# Patient Record
Sex: FEMALE | Race: WHITE | NOT HISPANIC OR LATINO | Employment: OTHER | URBAN - METROPOLITAN AREA
[De-identification: names, ages, dates, MRNs, and addresses within clinical notes are randomized per-mention and may not be internally consistent; named-entity substitution may affect disease eponyms.]

---

## 2020-07-16 ENCOUNTER — TELEPHONE (OUTPATIENT)
Dept: TRANSPLANT | Facility: CLINIC | Age: 85
End: 2020-07-16

## 2020-07-16 NOTE — TELEPHONE ENCOUNTER
Patient called to get information about the referral but there was no answer unable to leave a message voice mail not set up

## 2020-07-16 NOTE — TELEPHONE ENCOUNTER
----- Message from Kimberly Quiñones MA sent at 7/16/2020 10:05 AM CDT -----    ----- Message -----  From: Suyapa Kelly  Sent: 7/16/2020   9:42 AM CDT  To: [facility]to Laurent Staff      Name of Who is Calling Nena with        What is the request in detail: Pt would like to be schedule from referral , states referral was sent on July 6, 2020 . Please call to schedule patient.Please contact to further discuss and advise    Can the clinic reply by [facility]: no      What Number to Call Back if not in [facility]: [phone]

## 2020-07-23 ENCOUNTER — DOCUMENTATION ONLY (OUTPATIENT)
Dept: TRANSPLANT | Facility: CLINIC | Age: 85
End: 2020-07-23

## 2020-07-23 ENCOUNTER — TELEPHONE (OUTPATIENT)
Dept: TRANSPLANT | Facility: CLINIC | Age: 85
End: 2020-07-23

## 2020-07-23 NOTE — NURSING
Pt records reviewed.   Pt will be referred to Hepatology.  Abnormal imaging with BX  Patient to get outside imaging.

## 2020-07-23 NOTE — TELEPHONE ENCOUNTER
----- Message from Kimberly Quiñones MA sent at 7/22/2020  4:20 PM CDT -----    ----- Message -----  From: Alondra Tilley  Sent: 7/22/2020  12:22 PM CDT  To: [facility]to Laurent Staff    Pt wants to speak with you regarding scheduling appt for consult.   Pt#[phone]

## 2020-07-24 ENCOUNTER — DOCUMENTATION ONLY (OUTPATIENT)
Dept: TRANSPLANT | Facility: CLINIC | Age: 85
End: 2020-07-24

## 2020-07-24 NOTE — NURSING
"Spoke to Dr Pressley, he will see the patient.  Gayla Kyle LPN notified to schedule. Pt has CD"s   "

## 2020-07-30 ENCOUNTER — INITIAL CONSULT (OUTPATIENT)
Dept: TRANSPLANT | Facility: CLINIC | Age: 85
End: 2020-07-30
Payer: MEDICARE

## 2020-07-30 VITALS
HEIGHT: 66 IN | HEART RATE: 89 BPM | OXYGEN SATURATION: 97 % | TEMPERATURE: 99 F | BODY MASS INDEX: 36.21 KG/M2 | SYSTOLIC BLOOD PRESSURE: 118 MMHG | DIASTOLIC BLOOD PRESSURE: 74 MMHG | WEIGHT: 225.31 LBS | RESPIRATION RATE: 16 BRPM

## 2020-07-30 DIAGNOSIS — R16.0 LIVER MASS, RIGHT LOBE: ICD-10-CM

## 2020-07-30 PROCEDURE — 1159F PR MEDICATION LIST DOCUMENTED IN MEDICAL RECORD: ICD-10-PCS | Mod: S$GLB,,, | Performed by: TRANSPLANT SURGERY

## 2020-07-30 PROCEDURE — 1125F PR PAIN SEVERITY QUANTIFIED, PAIN PRESENT: ICD-10-PCS | Mod: S$GLB,,, | Performed by: TRANSPLANT SURGERY

## 2020-07-30 PROCEDURE — 1125F AMNT PAIN NOTED PAIN PRSNT: CPT | Mod: S$GLB,,, | Performed by: TRANSPLANT SURGERY

## 2020-07-30 PROCEDURE — 99999 PR PBB SHADOW E&M-EST. PATIENT-LVL III: CPT | Mod: PBBFAC,,,

## 2020-07-30 PROCEDURE — 99204 PR OFFICE/OUTPT VISIT, NEW, LEVL IV, 45-59 MIN: ICD-10-PCS | Mod: S$GLB,,, | Performed by: TRANSPLANT SURGERY

## 2020-07-30 PROCEDURE — 1101F PT FALLS ASSESS-DOCD LE1/YR: CPT | Mod: CPTII,S$GLB,, | Performed by: TRANSPLANT SURGERY

## 2020-07-30 PROCEDURE — 99999 PR PBB SHADOW E&M-EST. PATIENT-LVL III: ICD-10-PCS | Mod: PBBFAC,,,

## 2020-07-30 PROCEDURE — 1101F PR PT FALLS ASSESS DOC 0-1 FALLS W/OUT INJ PAST YR: ICD-10-PCS | Mod: CPTII,S$GLB,, | Performed by: TRANSPLANT SURGERY

## 2020-07-30 PROCEDURE — 1159F MED LIST DOCD IN RCRD: CPT | Mod: S$GLB,,, | Performed by: TRANSPLANT SURGERY

## 2020-07-30 PROCEDURE — 99204 OFFICE O/P NEW MOD 45 MIN: CPT | Mod: S$GLB,,, | Performed by: TRANSPLANT SURGERY

## 2020-07-30 RX ORDER — TRAMADOL HYDROCHLORIDE 50 MG/1
50 TABLET ORAL EVERY 12 HOURS PRN
Qty: 20 TABLET | Refills: 0 | Status: SHIPPED | OUTPATIENT
Start: 2020-07-30

## 2020-07-30 NOTE — H&P (VIEW-ONLY)
Subjective:       Patient ID: Magaly Hutchinson is a 84 y.o. female.    Chief Complaint: Abnormal Abdominal/Liver Imaging (Large liver mass)    84 F referred for management of large liver mass. She has noted abdominal distension over past several months/year which she attributed to weight gain. A few months ago she noted a pre-syncopal episode, abdominal pain. She was ultimately diagnosed with very large right lobe liver mass extending into the right retroperitoneum with displacement of right kidney and colon and compression of vena cava. She has also noted increased swelling in her legs, right greater than left. She denies any personal history of cancer. Today she is in a wheelchair on account of longer walking distances, but she gets around the house. She lives with her granddaughter and great grandson who help care for her, but she is able to carry out ADLs. 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    Review of Systems   Constitutional: Negative for activity change and appetite change.   HENT: Negative for sinus pressure/congestion and sore throat.    Eyes: Negative for redness and visual disturbance.   Respiratory: Negative for cough and shortness of breath.    Cardiovascular: Negative for chest pain and palpitations.   Gastrointestinal: Negative for abdominal distention and abdominal pain.   Endocrine: Negative for polydipsia and polyuria.   Genitourinary: Negative for dysuria and frequency.   Musculoskeletal: Negative for arthralgias and back pain.   Integumentary:  Negative for rash and wound.   Allergic/Immunologic: Negative for environmental allergies and immunocompromised state.   Neurological: Negative for tremors and seizures.   Hematological: Negative for adenopathy. Does not bruise/bleed easily.   Psychiatric/Behavioral: Negative for behavioral problems and confusion.         Objective:      Physical Exam  Constitutional:       General: She is not in  acute distress.     Appearance: She is not diaphoretic.   Neck:      Vascular: No JVD.   Cardiovascular:      Rate and Rhythm: Normal rate and regular rhythm.   Pulmonary:      Effort: Pulmonary effort is normal. No respiratory distress.      Breath sounds: No wheezing.   Abdominal:      Palpations: Abdomen is soft.   Skin:     General: Skin is warm and dry.   Neurological:      Mental Status: She is alert and oriented to person, place, and time.         Assessment:       1. Liver mass, right lobe        Plan:       Large mass with cystic features. Biopsy from outside center benign, will follow up path results. Imaging most consistent with complex hepatic cyst vs hemangioma, most likely the former. However, the mass is creating significant discomfort and displacement of abdominal structures, concerning for possible vena cava compression. Despite her age, her mind is sharp and she is very active for her age. I expect she will continue to have worsening symptoms and complications related to the mass. Although surgical resection comes with increased risk given her age, I think the benefits outweigh the risks and are in line with her personal goals of care. I discussed in details the risks of surgery including death, bleeding, infection, bile leak, ileus, liver insufficiency, liver failure, renal failure and recurrence.     She would like to proceed with resection. She will need an echocardiogram and pre op assessment with Dr Nolasco. I will upload her images and discuss her case at tumor board.    Pa Pressley MD  Liver Transplant and Hepatobiliary Surgery      2. The status of comorbities. (See ED/admit documents)

## 2020-07-30 NOTE — PROGRESS NOTES
Subjective:       Patient ID: Magaly Hutchinson is a 84 y.o. female.    Chief Complaint: Abnormal Abdominal/Liver Imaging (Large liver mass)    84 F referred for management of large liver mass. She has noted abdominal distension over past several months/year which she attributed to weight gain. A few months ago she noted a pre-syncopal episode, abdominal pain. She was ultimately diagnosed with very large right lobe liver mass extending into the right retroperitoneum with displacement of right kidney and colon and compression of vena cava. She has also noted increased swelling in her legs, right greater than left. She denies any personal history of cancer. Today she is in a wheelchair on account of longer walking distances, but she gets around the house. She lives with her granddaughter and great grandson who help care for her, but she is able to carry out ADLs. 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    Review of Systems   Constitutional: Negative for activity change and appetite change.   HENT: Negative for sinus pressure/congestion and sore throat.    Eyes: Negative for redness and visual disturbance.   Respiratory: Negative for cough and shortness of breath.    Cardiovascular: Negative for chest pain and palpitations.   Gastrointestinal: Negative for abdominal distention and abdominal pain.   Endocrine: Negative for polydipsia and polyuria.   Genitourinary: Negative for dysuria and frequency.   Musculoskeletal: Negative for arthralgias and back pain.   Integumentary:  Negative for rash and wound.   Allergic/Immunologic: Negative for environmental allergies and immunocompromised state.   Neurological: Negative for tremors and seizures.   Hematological: Negative for adenopathy. Does not bruise/bleed easily.   Psychiatric/Behavioral: Negative for behavioral problems and confusion.         Objective:      Physical Exam  Constitutional:       General: She is not in  acute distress.     Appearance: She is not diaphoretic.   Neck:      Vascular: No JVD.   Cardiovascular:      Rate and Rhythm: Normal rate and regular rhythm.   Pulmonary:      Effort: Pulmonary effort is normal. No respiratory distress.      Breath sounds: No wheezing.   Abdominal:      Palpations: Abdomen is soft.   Skin:     General: Skin is warm and dry.   Neurological:      Mental Status: She is alert and oriented to person, place, and time.         Assessment:       1. Liver mass, right lobe        Plan:       Large mass with cystic features. Biopsy from outside center benign, will follow up path results. Imaging most consistent with complex hepatic cyst vs hemangioma, most likely the former. However, the mass is creating significant discomfort and displacement of abdominal structures, concerning for possible vena cava compression. Despite her age, her mind is sharp and she is very active for her age. I expect she will continue to have worsening symptoms and complications related to the mass. Although surgical resection comes with increased risk given her age, I think the benefits outweigh the risks and are in line with her personal goals of care. I discussed in details the risks of surgery including death, bleeding, infection, bile leak, ileus, liver insufficiency, liver failure, renal failure and recurrence.     She would like to proceed with resection. She will need an echocardiogram and pre op assessment with Dr Nolasco. I will upload her images and discuss her case at tumor board.    Pa Pressley MD  Liver Transplant and Hepatobiliary Surgery

## 2020-08-04 ENCOUNTER — TELEPHONE (OUTPATIENT)
Dept: PREADMISSION TESTING | Facility: HOSPITAL | Age: 85
End: 2020-08-04

## 2020-08-04 ENCOUNTER — TELEPHONE (OUTPATIENT)
Dept: TRANSPLANT | Facility: CLINIC | Age: 85
End: 2020-08-04

## 2020-08-04 DIAGNOSIS — R16.0 LIVER MASS, RIGHT LOBE: ICD-10-CM

## 2020-08-04 DIAGNOSIS — Z01.818 PRE-OP TESTING: Primary | ICD-10-CM

## 2020-08-04 NOTE — TELEPHONE ENCOUNTER
Returned call to patient to see what she was calling regarding. Patient was asking about an EKG, we have none scheduled but will need one prior to surgery. Patient needs ECHO. Patient states she wanted it at [facility] but is okay having it scheduled at [facility] in [place]. Will schedule appts.

## 2020-08-04 NOTE — TELEPHONE ENCOUNTER
----- Message from Gayla Kyle LPN sent at 8/4/2020 11:51 AM CDT -----  Regarding: Pre op with Dr. Nolasco  Good morning,     Dr. Pressley would like this patient seen by Dr. Nolasco for pre op clearance for liver surgery.   Can you help with this?     Thanks,   Vick

## 2020-08-04 NOTE — TELEPHONE ENCOUNTER
8/6 spoke with patient. She is waiting on a call about a stress test. She will do it here or [facility]

## 2020-08-05 ENCOUNTER — TELEPHONE (OUTPATIENT)
Dept: TRANSPLANT | Facility: CLINIC | Age: 85
End: 2020-08-05

## 2020-08-05 NOTE — TELEPHONE ENCOUNTER
Called patient to notify her that ECHO is scheduled for Monday, August 10 at 3:15. Verbalized acknowledgment.

## 2020-08-06 ENCOUNTER — TELEPHONE (OUTPATIENT)
Dept: TRANSPLANT | Facility: HOSPITAL | Age: 85
End: 2020-08-06

## 2020-08-06 ENCOUNTER — INITIAL CONSULT (OUTPATIENT)
Dept: INTERNAL MEDICINE | Facility: CLINIC | Age: 85
End: 2020-08-06
Payer: MEDICARE

## 2020-08-06 VITALS
BODY MASS INDEX: 36 KG/M2 | RESPIRATION RATE: 18 BRPM | WEIGHT: 224 LBS | TEMPERATURE: 98 F | HEART RATE: 92 BPM | HEIGHT: 66 IN | DIASTOLIC BLOOD PRESSURE: 78 MMHG | SYSTOLIC BLOOD PRESSURE: 128 MMHG | OXYGEN SATURATION: 97 %

## 2020-08-06 DIAGNOSIS — Z79.02 LONG TERM (CURRENT) USE OF ANTITHROMBOTICS/ANTIPLATELETS: ICD-10-CM

## 2020-08-06 DIAGNOSIS — R60.9 EDEMA, UNSPECIFIED TYPE: ICD-10-CM

## 2020-08-06 DIAGNOSIS — R16.0 LIVER MASS, RIGHT LOBE: Primary | ICD-10-CM

## 2020-08-06 DIAGNOSIS — D64.9 ANEMIA, UNSPECIFIED TYPE: ICD-10-CM

## 2020-08-06 DIAGNOSIS — M54.31 SCIATICA OF RIGHT SIDE: ICD-10-CM

## 2020-08-06 DIAGNOSIS — Z96.653 HISTORY OF BILATERAL KNEE REPLACEMENT: ICD-10-CM

## 2020-08-06 DIAGNOSIS — K59.00 CONSTIPATION, UNSPECIFIED CONSTIPATION TYPE: ICD-10-CM

## 2020-08-06 DIAGNOSIS — E87.1 HYPONATREMIA: ICD-10-CM

## 2020-08-06 DIAGNOSIS — Z90.710 H/O: HYSTERECTOMY: ICD-10-CM

## 2020-08-06 DIAGNOSIS — K76.9 LIVER DISEASE, UNSPECIFIED: Primary | ICD-10-CM

## 2020-08-06 DIAGNOSIS — R16.0 LIVER MASS, RIGHT LOBE: ICD-10-CM

## 2020-08-06 DIAGNOSIS — K76.9 LIVER LESION, RIGHT LOBE: ICD-10-CM

## 2020-08-06 DIAGNOSIS — D37.6 NEOPLASM OF UNCERTAIN BEHAVIOR OF LIVER: ICD-10-CM

## 2020-08-06 DIAGNOSIS — E11.9 TYPE 2 DIABETES MELLITUS WITHOUT COMPLICATION, WITHOUT LONG-TERM CURRENT USE OF INSULIN: ICD-10-CM

## 2020-08-06 DIAGNOSIS — Z01.818 PRE-OP TESTING: Primary | ICD-10-CM

## 2020-08-06 DIAGNOSIS — N28.9 RENAL IMPAIRMENT: ICD-10-CM

## 2020-08-06 DIAGNOSIS — E46 MALNUTRITION, UNSPECIFIED TYPE: ICD-10-CM

## 2020-08-06 DIAGNOSIS — R97.8 OTHER ABNORMAL TUMOR MARKERS: ICD-10-CM

## 2020-08-06 DIAGNOSIS — R63.4 WEIGHT LOSS: ICD-10-CM

## 2020-08-06 DIAGNOSIS — E88.09 SERUM ALBUMIN DECREASED: ICD-10-CM

## 2020-08-06 DIAGNOSIS — R93.2 ABNORMAL FINDINGS ON DIAGNOSTIC IMAGING OF LIVER AND BILIARY TRACT: ICD-10-CM

## 2020-08-06 DIAGNOSIS — E78.5 HYPERLIPIDEMIA, UNSPECIFIED HYPERLIPIDEMIA TYPE: ICD-10-CM

## 2020-08-06 DIAGNOSIS — I10 ESSENTIAL HYPERTENSION: ICD-10-CM

## 2020-08-06 PROCEDURE — 99999 PR PBB SHADOW E&M-EST. PATIENT-LVL III: ICD-10-PCS | Mod: PBBFAC,,, | Performed by: HOSPITALIST

## 2020-08-06 PROCEDURE — 99204 OFFICE O/P NEW MOD 45 MIN: CPT | Mod: S$GLB,,, | Performed by: HOSPITALIST

## 2020-08-06 PROCEDURE — 99999 PR PBB SHADOW E&M-EST. PATIENT-LVL III: CPT | Mod: PBBFAC,,, | Performed by: HOSPITALIST

## 2020-08-06 PROCEDURE — 1159F PR MEDICATION LIST DOCUMENTED IN MEDICAL RECORD: ICD-10-PCS | Mod: S$GLB,,, | Performed by: HOSPITALIST

## 2020-08-06 PROCEDURE — 1159F MED LIST DOCD IN RCRD: CPT | Mod: S$GLB,,, | Performed by: HOSPITALIST

## 2020-08-06 PROCEDURE — 1101F PR PT FALLS ASSESS DOC 0-1 FALLS W/OUT INJ PAST YR: ICD-10-PCS | Mod: CPTII,S$GLB,, | Performed by: HOSPITALIST

## 2020-08-06 PROCEDURE — 99204 PR OFFICE/OUTPT VISIT, NEW, LEVL IV, 45-59 MIN: ICD-10-PCS | Mod: S$GLB,,, | Performed by: HOSPITALIST

## 2020-08-06 PROCEDURE — 1101F PT FALLS ASSESS-DOCD LE1/YR: CPT | Mod: CPTII,S$GLB,, | Performed by: HOSPITALIST

## 2020-08-06 RX ORDER — METFORMIN HYDROCHLORIDE 500 MG/1
500 TABLET ORAL
Status: ON HOLD | COMMUNITY
End: 2020-08-31 | Stop reason: HOSPADM

## 2020-08-06 NOTE — ASSESSMENT & PLAN NOTE
At the age of 37 Y  Had 4 children, 4 miss carriages   Has ovaries retained  Had hysterectomy as she completed her family

## 2020-08-06 NOTE — ASSESSMENT & PLAN NOTE
Swelling of the legs   Since April 2020  Rt > Left   No leg pains  Was given diuretic- no longer taking that as she did not find them helpful    Sleeps with legs elevated that is helping the edema    Not known to have Heart , liver , kidney problems  Not known to have proteinuria, Nephrotic syndrome     No thrombosis     Likely cause of Edema- venous congestion from the abdominal tumor    Edema- I suggested avoidance of added salt,avoidance of NSAID's, unless advised or ordered  and suggested Limb elevation

## 2020-08-06 NOTE — LETTER
August 6, 2020      Pa Pressley MD  [address] [facility] [address]           [facility] - Pre Op Consult  [address] [facility] [address]  Phone: [phone]          Patient: Magaly Hutchinson   MR Number: [id number]   YOB: 1935   Date of Visit: 8/6/2020       Dear Dr. Pa Pressley:    Thank you for referring Magaly Hutchinson to me for evaluation. Attached you will find relevant portions of my assessment and plan of care.    If you have questions, please do not hesitate to call me. I look forward to following Magaly Hutchinson along with you.    Sincerely,    Betty Nolasco MD    Enclosure  CC:  Adriano Reynaga MD    If you would like to receive this communication electronically, please contact [email or web address] or [phone] to request more information on [employer] access.    For providers and/or their staff who would like to refer a patient to [facility], please contact us through our one-stop-shop provider referral line, [facility], [place], at [phone].    If you feel you have received this communication in error or would no longer like to receive these types of communications, please e-mail [email or web address]

## 2020-08-06 NOTE — ASSESSMENT & PLAN NOTE
Right abdominal mass.     She noticed Abdominal distension - for 1.5 - 2 years  Tried diet thinking that she is gaining weight -with no positive result   Was having abdominal fullness for a few months    She has been troubled with severe  Right upper abdominal pain for 8-9 days     Pain increases with activity and decreases with lay down  and resting     Has pressure effects from the growth     Able to eat   Feels squires faster for the past few months  Lost about 20 pounds past 2 months  Had reduced appetite     Had some vomiting -from being full in the stomach ( since eating smaller meals )  - none for 1 month   Non projectile   Lately no vomiting     Has long standing constipation - uses OTC laxatives        Does not seem to have pressure effects on the lungs   No SOB  No cough, Phlegm     No suggestion of Atelectasis, Pneumonoia    On exam Decreased air entry Rt mid- Rt lower zones

## 2020-08-06 NOTE — ASSESSMENT & PLAN NOTE
Suggested protein intake ( shakes ) to help with healing process   Referred to Dietary  She did not have dietary evaluation

## 2020-08-06 NOTE — ASSESSMENT & PLAN NOTE
ASA 81 mg every other day   Preventive reason   No stroke, TIA, Heart attack   No vascular stenting     Holding ASA in preparation for surgery     Risks, benefits of ASA use discussed     I favor staying on  ASA ,if OK with surgeon , as she is at increased risk of thrombosis for venous stasis - will check with Surgeon      Corresponded with the surgeon   Staying on ASA gilberto op

## 2020-08-06 NOTE — ASSESSMENT & PLAN NOTE
As per history mild  Likely nutritional     Microcytic anemia  -  Soluble transferrin receptor Normal at 3.4 suggesting no Iron deficiency

## 2020-08-06 NOTE — HPI
History of present illness- I had the pleasure of meeting this pleasant 84 y.o. lady in the pre op clinic prior to her elective Abdominal surgery. The patient is new to me . Magaly was accompanied by grand daughter Alyx.    I have obtained the history by speaking to the patient and by reviewing the electronic health records.    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 until April 20210     Had a near fall in April 2020 , felt weak and was about to  fall   Was walking to her table , had her Coffee , V8 - stopped the fall by holding on a chair   sat on her chair ,Put her head down - came around   No LOC  Walked out side     No heart palpitations  No preceding dizziness , chest discomfort , dizziness      Being a diabetic thought that it could be hypoglycemia - Dis not have a glucometer   Returned to normal in about 10 minutes   In 30 minutes she was able to drive and paid her bills    May 3 rd had seem MD routinely for Medication refill  Had labs   Was noticed to have Swellimg in her legs,Was hospitalized   Was suspected, to have thrombosis - was tested negative for thrombosis  As per her no fluid retention of lungs   During the hospitalization wass found to have tumor in the abdomen     Had oncology evaluation in June ,Had biopsy - no cancer   As per description, solid growth      Was referred to [facility] foor further care     Swelling of the legs   Since April 2020  Rt > Left   No leg pains  Was given diuretic     She noticed Abdominal distension - for 1.5 - 2 years  Tried diet with no positive result   She has been troubled with severe  Right upper abdominal pain for 8-9 days   Was having abdominal fullness for a few months    Pain increases with activity and decreases with lay down  and resting     Able to eat   Feels squires faster for the past few months  Lost about 20  pounds past 2 months  Had reduced appetite     Had vomiting -from being full in the stomach - none for 1 month   Has long standing constipation - uses OTC laxatives       Relevant health conditions of significance for the perioperative period/ History of presenting illness -    Subjectively describes health as good - except the tumor    Health conditions of significance for the perioperative period     HTN    Type 2 DM    Despite her advanced age , she is very active   Lost her  in 2002  Self caring , house work  Was working ( Was caring for a lady aged  90  Y )  and driving 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

## 2020-08-06 NOTE — PROGRESS NOTES
Chief complaint-Preoperative evaluation , Perioperative Medical management, complication reduction plan     Date of Evaluation- 08/21/2020    PCP-  Primary Doctor No    Future cases for Magaly Hutchinson [[id number]]     Case ID Status Date Time Alexandro Procedure Provider Location    [id number] [facility] 8/24/2020 12:30  EXCISION, LIVER Pa Pressley MD [[id number]] [facility] OR 2ND FLR        History of present illness- I had the pleasure of meeting this pleasant 84 y.o. lady in the pre op clinic prior to her elective Abdominal surgery. The patient is new to me . Magaly was accompanied by grand daughter Alyx.    I have obtained the history by speaking to the patient and by reviewing the electronic health records.    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  and was taking care of her self until March 20210     Had a near fall in April 2020 , felt weak and was about to  fall   Was walking to her table , had her Coffee , V8 - stopped the fall by holding on a chair   sat on her chair ,Put her head down - came around   No LOC  Walked out side     No heart palpitations  No preceding dizziness , chest discomfort , dizziness      Being a diabetic thought that it could be hypoglycemia - Dis not have a glucometer   Returned to normal in about 10 minutes   In 30 minutes she was able to drive and paid her bills    May 3 rd had seem MD routinely for Medication refill  Had labs   Was noticed to have Swellimg in her legs,Was hospitalized   Was suspected, to have thrombosis - was tested negative for thrombosis  As per her no fluid retention of lungs   During the hospitalization wass found to have tumor in the abdomen     Had oncology evaluation in June ,Had biopsy - no cancer   As per description, solid growth      Was referred to [facility] foor further care     Swelling of the legs   Since April 2020  Rt > Left   No leg pains  Was given diuretic     She  noticed Abdominal distension - for 1.5 - 2 years  Tried diet with no positive result   She has been troubled with severe  Right upper abdominal pain for 8-9 days   Was having abdominal fullness for a few months    Pain increases with activity and decreases with lay down  and resting     Able to eat   Feels squires faster for the past few months  Lost about 20 pounds past 2 months  Had reduced appetite     Had vomiting -from being full in the stomach - none for 1 month   Has long standing constipation - uses OTC laxatives       Relevant health conditions of significance for the perioperative period/ History of presenting illness -    Subjectively describes health as good - except the tumor    Health conditions of significance for the perioperative period     HTN    Type 2 DM    Despite her advanced age , she is very active   Lost her  in 2002  Self caring , house work  Was working ( Was caring for a lady aged  90  Y )  and driving 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     Outpatient Subjective & Objective     Chief complaint-Preoperative evaluation, Perioperative Medical management, complication reduction plan     Active cardiac conditions- none    Revised cardiac risk index predictors- high-risk type of surgery    Functional capacity -Examples of physical activity , self care , was active 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, gardening , flowers,was walking 3-4 K steps a day,was active to take a flght of stairs until  March 2020She can undertake all the above activities without  chest pain,chest tightness, Shortness of breath ,dizziness,lightheadedness making her exercise tolerance more,   than 4 Mets.       Review of Systems   Constitutional: Negative for chills and fever.   HENT:        STOPBANG score  2/ 8        HTN   Age over 50        Eyes:        " No new visual changes   Respiratory:        No cough , phlegm    No Hemoptysis   Cardiovascular:        As noted   Gastrointestinal:        No overt GI/ blood losses  Bowel movements-  Constipated  No change in bowl habit   Suggested follow up    Endocrine:        Prednisone use > 20 mg daily for 3 weeks- None    Genitourinary: Negative for dysuria.        No urinary hesitancy    Musculoskeletal:        Knees replaced   No unusual, muscle, joint pains   Skin: Negative for rash.   Neurological: Negative for syncope.        No unilateral weakness   Hematological:        Current use of Anticoagulants  None    Psychiatric/Behavioral:        No Depression,Anxiety         No past medical history pertinent negatives.        No anesthesia, bleeding, cardiac problems , PONV with previous surgeries/procedures.  Medications and Allergies reviewed in epic.    FH- No anesthesia, venous thrombosis , early onset heart disease in family      Physical Exam  Blood pressure 128/78, pulse 92, temperature 98.2 °F (36.8 °C), temperature source Oral, resp. rate 18, height 5' 6" (1.676 m), weight 101.6 kg (224 lb), SpO2 97 %.      Investigations  Lab and Imaging have been reviewed in epic.      Review of old records- Was done and information gathered regards to events leading to surgery and health conditions of significance in the perioperative period.    Outpatient Subjective & Objective             Assessment and plan    New problems to me          Preoperative  risk assessment    Cardiac    Revised cardiac risk index ( RCRI ) predictors- 1---.Functional capacity  Is more than 4 Mets. She will be undergoing a open abdominal procedure that carries a high risk     Risk of a major Cardiac event ( Defined as death, myocardial infarction, or cardiac arrest at 30 days after noncardiac surgery), based on RCRI score        -6.0%     Having a stress test     American Society of Anesthesiologists Physical status classification ( ASA ) class- - " 2         Perioperative Medical management / Optimization    HTN    Amlodipine     Home BP readings -120/70's   Prone for low BP-Decreased venous return, Weight loss  Recent BP readings in the record-120/70's  Hypertension-  Blood pressure is acceptable . I suggest continuation of amlodipine - during the entire perioperative period. I suggest  blood pressure,monitoring .I suggest addressing pain control as uncontrolled pain can increased blood pressure     Has been taking Amlodipine for a long time     Amlodipine could be contributing to her Peripheral edema    HLD    HLD-I  suggest continuation of statin during the entire perioperative period.    Hysterectomy    At the age of 37 Y  Had 4 children, 4 miss nithin   Has ovaries retained  Had hysterectomy as she completed her family     ASA use     ASA 81 mg every other day   Preventive reason   No stroke, TIA, Heart attack   No vascular stenting     Holding ASA in preparation for surgery     Risks, benefits of ASA use discussed     I favor staying on  ASA ,if OK with surgeon , as she is at increased risk of thrombosis for venous stasis - will check with Surgeon        Longterm ASA    ASA 81 mg every other day   Preventive reason   No stroke, TIA, Heart attack   No vascular stenting     Holding ASA in preparation for surgery     Risks, benefits of ASA use discussed     I favor staying on  ASA ,if OK with surgeon , as she is at increased risk of thrombosis for venous stasis - will check with Surgeon      Corresponded with surgeon- staying on ASA        Anemia    As per history mild  Likely nutritional   Due for labs    Type 2 DM    Type 2 Diabetes Mellitus  On treatment with oral agent,not on  Insulin    Hemoglobin A1c-   Capillary glucose check-None   Watches diet  Weight loss, reduced oral intake may have helped her diabetes    DM complications     Not known to have kidney ,eye involvement  Gets annual eye checks    No Stroke, Coronary artery disease   No  suggestions of lower extremity claudication       Diabetes Mellitus-I suggest monitoring the glucose in the perioperative period ( Before meals and bed time,if the patient is on oral feeds or every 6 hourly ,if the patient is NPO )  Blood glucose target in hospitalized patients is 140-180. Oral Hypoglycemic agents are generally avoided during the hospital stay . If glucose is consistently elevated ,I suggest using basal ,prandial Insulin regimen to control the glucose , as elevated glucose can be associated with adverse surgical out comes. Please consider involving Hospital Medicine or Endocrinology ,if any help is needed with Glucose control. Patient will be instructed based on the pre op clinic guidelines  about adjustment of diabetic treatment (If applicable )  considering the NPO status for Surgery     A1c ordered    Weight loss    Suggested protein intake ( shakes ) to help with healing process    Constipation      Constipation- I suggest giving laxative regimen as opioid use,reduced ambulation  can increase the constipation     Liver mass     Right abdominal mass.     She noticed Abdominal distension - for 1.5 - 2 years  Tried diet thinking that she is gaining weight -with no positive result   Was having abdominal fullness for a few months    She has been troubled with severe  Right upper abdominal pain for 8-9 days     Pain increases with activity and decreases with lay down  and resting     Has pressure effects from the growth     Able to eat   Feels squires faster for the past few months  Lost about 20 pounds past 2 months  Had reduced appetite     Had some vomiting -from being full in the stomach ( since eating smaller meals )  - none for 1 month   Non projectile   Lately no vomiting     Has long standing constipation - uses OTC laxatives        Does not seem to have pressure effects on the lungs   No SOB  No cough, Phlegm     No suggestion of Atelectasis, Pneumonoia    On exam Decreased air entry Rt mid-  Rt lower zones     Bilateral knee replacements   '  2003  Did well     Edema    Swelling of the legs   Since April 2020  Rt > Left   No leg pains  Was given diuretic- no longer taking that as she did not find them helpful    Sleeps with legs elevated that is helping the edema    Not known to have Heart , liver , kidney problems  Not known to have proteinuria, Nephrotic syndrome     No thrombosis     Likely cause of Edema- venous congestion from the abdominal tumor    Edema- I suggested avoidance of added salt,avoidance of NSAID's, unless advised or ordered  and suggested Limb elevation            Preventive perioperative care    Thromboembolic prophylaxis:  Her risk factors for thrombosis include surgical procedure, age and reduced mobility.I suggest  thromboembolic prophylaxis.I suggested being active in the post operative period.     Postoperative pulmonary complication prophylaxis-Risk factors for post operative pulmonary complications include  age over 65  years , proximity of the surgical site to the lungs and  general anesthesia - I suggest incentive Spirometry use ,  early ambulation ,  end tidal carbon dioxide  Monitoring ,   pain control so as to avoid diaphragmatic splinting ,  oral care  and  head end of bed elevation      Renal complication prophylaxis-Risk factors for renal complications include Diabetes Mellitus and Hypertension . I suggest keeping her well hydrated  in the perioperative period  Drinks 4-5 bottles , 16 Ox each     Surgical site Infection Prophylaxis-I  suggest appropriate antibiotic for Prophylaxis against Surgical site infections  No reported Staph infection     Delirium prophylaxis-Risk factors - Advanced age - I suggest avoidance / minimizing the use of  Benzodiazepines ( unless the patient has been taking it on a regular basis ),Anticholinergic medication,Antihistamines ( like  Benadryl).I suggest minimizing the use of opioid medication and use of IV tylenol,if it is appropriate. I  suggest using the lowest possible dose of opioids for the shortest duration possible in the perioperative period. I suggest to Keep shades/blinds open during the day, lights off and shades closed at night to encourage normal sleep/wake cycle.I encourage the presence of the family member with the patient at all times, if at all possible as mental status changes can be picked up early by the family members and they help with reorientation. I encouraged the presence of family to help with orientation in the perioperative period. Benadryl avoidance suggested    She likes Crossword Puzzles - suggested doing puzzles        This visit was focussed on Preoperative evaluation , Perioperative Medical management, complication reduction plans and I suggest that the patient follows up with the primary care ,relevant sub specialists for on going health care      I appreciate the opportunity to be involved in this  delightful  lady's care.Please feel free to contact me if there were any questions about this consultation     Patient is Medically  optimized   for the above planned surgery/ procedure    Patient was instructed to call and update me about any changes to health,  medication, office visits ,testing out side of the [facility] operative care center , hospitalizations between now and surgery     Dr MAK Nolasco MD MRCP ( ),[facility]   [facility]  [facility]   Pager [phone], Cell [phone]    COVID screening     No fever -  No cough -  No SOB-  No sore throat -  No loss of taste or smell -  No muscle aches -  No nausea, vomiting , diarrhea-  --  8/6- 17 05     Messaged surgeon about gilberto op use of ASA  --   8/7-9 49     Corresponded with surgeon about ASA  Plan is to stay on ASA [facility] op     Labs from 8/6   Hb 10.4   HCT 35.6    MCV 77    INR-N    Hyponatremia- 128  Drinks 4-5 bottles , 16 Oz each - occasional apple juice ,tea, milk  Likely cause is third spacing -  effectively Hypovolemic   Could be from the tumor   Suggested protein intake     Plan     - to stay on ASA for surgery  - Check Ferritin      Called and spoke to her   No overt GI/  blood losses   Had Colonoscopy long time ago   Can stay on coated ASA- with food   Watch for bleeding while taking ASA-   --  8/7- 17 28     Ferritin elevated at 1161- no recent infections   Called and spoke to her  No personal, family history of  history of iron overload-No history of blood letting   Ordered Soluble transferrin receptor    BMP  Had blood transfusion around the time of hysterectomy about 40 years ago   No bleeding problem  --  8/10- 12 01    Working on Anemia, Hyponatremia    Checking soluble transferrin receptor for anemia    Rechecking BMP-   Check serum Osmolality   Check Urine osmolality and urine sodium   --  8/10- 17 40     Labs from 8/10 showed - normalization of sodium   Serum osmolality - Normal   -  8/12- 12 45     Stress test from 8/10 showed  · Normal left ventricular systolic function. The estimated ejection fraction is 60%.  · Normal right ventricular systolic function.  · Normal LV diastolic function.  · Significant ascites present.  · The estimated PA systolic pressure is 27 mmHg.  · Normal central venous pressure (3 mmHg).  · The ECG portion of this study is negative for myocardial ischemia.  · The stress echo portion of this study is negative for myocardial ischemia.  · Overall, this study is negative for myocardial ischemia    EKG from 8/10 personally reviewed - reportedly  showed     Sinus rhythm with Fusion complexes   Moderate voltage criteria for LVH, may be normal variant   Nonspecific ST and T wave abnormality   Abnormal ECG   No previous ECGs available     CXR from 8/10 showed     No acute process seen      Called and spoke to her   Doing good   Referred Nutrition for weight loss  -   8/13- 13 50     Soluble transferrin receptor Normal at 3.4 suggesting no Iron deficiency   -  8/17- 16 31      Having Nutritionist evaluation 8/20   Stress Echo showed Significant ascites   MR from 5/29/2020 showed - No intra peritoneal fluid   -  8/20- 8 10  She cancelled the appointment that was made for her   Order  placed for  Nutrition    --  8/20- 10 44    Corresponded with surgeon about ascites   As per correspondence from 8/18    she had a pretty pronounced mass effect that could be affecting cava and portal flow.  -  8/21- 18 14         Postoperative pulmonary complication risk assessment    ARISCAT ( Canet) risk index- risk class -  Intermediate    if duration of surgery is under or equal to 3 hours ,high - if duration of surgery is  is over 2hours       Could not go to dietary evaluation due to transportation  Eating well , having 3 cans of boost a day   Called to follow up , spoke her  to to address any concerns with the up coming surgery or any questions on Medication instructions -  Doing well ,No changes to Medication, Health -    No iron overload   No blood letting in her or family   No personal or family history of hemochromatosis     Med instructions discussed   No overt GI/ blood losses     Hyponatremia    Lab from 8/10 /2020 showed resolution of hyponatremia  Urinating well - clear urine

## 2020-08-06 NOTE — TELEPHONE ENCOUNTER
Patient: Magaly Hutchinson       MRN: [id number]      : 1935     Age: 84 y.o.  [address]  [place] [address]    Provider: Seal    Urgency of review: urgent    Patient Transplant Status: Not a candidate    Reason for presentation: Indeterminate lesion    Clinical Summary: 84 F with large right abdominal mass. Pt reports increasing abdominal girth for past few years, progressive lower extremity edema. Attributed to weight gain. Also reports having known about cyst on her liver for several years.    Imaging to be reviewed: CT/MRI    HCC Treatment History: N/A    ABO:     Platelets:   Lab Results   Component Value Date/Time     2014 07:49 PM     Creatinine:   Lab Results   Component Value Date/Time    CREATININE 0.8 2014 07:49 PM     Bilirubin:   Lab Results   Component Value Date/Time    BILITOT 0.6 2014 07:49 PM     AFP Last 3 each if available: No results found for: AFP, EXTAFP    MELD: Computed MELD-Na score unavailable. Necessary lab results were not found in the last year.  Computed MELD score unavailable. Necessary lab results were not found in the last year.    Plan:     Follow-up Provider:

## 2020-08-06 NOTE — ASSESSMENT & PLAN NOTE
Amlodipine     Home BP readings -120/70's   Prone for low BP-Decreased venous return, Weight loss  Recent BP readings in the record-120/70's  Hypertension-  Blood pressure is acceptable . I suggest continuation of amlodipine - during the entire perioperative period. I suggest  blood pressure,monitoring .I suggest addressing pain control as uncontrolled pain can increased blood pressure     Has been taking Amlodipine for a long time     Amlodipine could be contributing to her Peripheral edema

## 2020-08-06 NOTE — OUTPATIENT SUBJECTIVE & OBJECTIVE
"Outpatient Subjective & Objective     Chief complaint-Preoperative evaluation, Perioperative Medical management, complication reduction plan     Active cardiac conditions- none    Revised cardiac risk index predictors- high-risk type of surgery    Functional capacity -Examples of physical activity , self care , was active 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, gardening , flowers,was walking 3-4 K steps a day,was active to take a flght of stairs until  March 2020She can undertake all the above activities without  chest pain,chest tightness, Shortness of breath ,dizziness,lightheadedness making her exercise tolerance more,   than 4 Mets.       Review of Systems   Constitutional: Negative for chills and fever.   HENT:        STOPBANG score  2/ 8        HTN   Age over 50        Eyes:        No new visual changes   Respiratory:        No cough , phlegm    No Hemoptysis   Cardiovascular:        As noted   Gastrointestinal:        No overt GI/ blood losses  Bowel movements-  Constipated  No change in bowl habit   Suggested follow up    Endocrine:        Prednisone use > 20 mg daily for 3 weeks- None    Genitourinary: Negative for dysuria.        No urinary hesitancy    Musculoskeletal:        Knees replaced   No unusual, muscle, joint pains   Skin: Negative for rash.   Neurological: Negative for syncope.        No unilateral weakness   Hematological:        Current use of Anticoagulants  None    Psychiatric/Behavioral:        No Depression,Anxiety         No past medical history pertinent negatives.        No anesthesia, bleeding, cardiac problems , PONV with previous surgeries/procedures.  Medications and Allergies reviewed in epic.    FH- No anesthesia, venous thrombosis , early onset heart disease in family      Physical Exam  Blood pressure 128/78, pulse 92, temperature 98.2 °F (36.8 °C), temperature source Oral, resp. rate 18, height 5' 6" (1.676 m), weight 101.6 kg (224 lb), SpO2 97 %.      Physical Exam  Constitutional- Vitals - Body " mass index is 36.15 kg/m².,   Vitals:    08/06/20 1320   BP: 128/78   Pulse: 92   Resp:    Temp:      General appearance-Conscious,Coherent  Eyes- No conjunctival icterus,pupils  Bilateral cataracts.Rt > Left , Rt eye pupil bigger than Left   ENT-Oral cavity-? thrush  and  moist  , Hearing grossly normal   Neck- No thyromegaly ,Trachea -central, No jugular venous distension,   No Carotid Bruit   Cardiovascular -Heart Sounds- Normal  and  no murmur   , No gallop rhythm   Respiratory - Decreased air entry Rt mid- Rt lower zones , Normal Respiratory Effort,  no wheeze  and  no forced expiratory wheeze    Peripheral pitting pedal edema--moderate , no calf pain   Gastrointestinal -Soft abdomen,  palpable mass- RUQ- MID- LUQ- RT Mid- Rt Lower - no overlying bruit , Non Tender,Liver,Spleen not palpable. No-- free fluid and shifting dullness  Musculoskeletal- No finger Clubbing. Strength grossly normal   Lymphatic-No Palpable cervical, axillary,Inguinal lymphadenopathy   Psychiatric - normal effect,Orientation  Rt Dorsalis pedis pulses-palpable    Lt Dorsalis pedis pulses- palpable   Rt Posterior tibial pulses -palpable   Left posterior tibial pulses -palpable   Miscellaneous -  no renal bruit  Investigations  Lab and Imaging have been reviewed in epic.      Review of old records- Was done and information gathered regards to events leading to surgery and health conditions of significance in the perioperative period.    Outpatient Subjective & Objective

## 2020-08-07 PROBLEM — E46 MALNUTRITION: Status: ACTIVE | Noted: 2020-08-07

## 2020-08-07 PROBLEM — E88.09 SERUM ALBUMIN DECREASED: Status: ACTIVE | Noted: 2020-08-07

## 2020-08-07 PROBLEM — E87.1 HYPONATREMIA: Status: ACTIVE | Noted: 2020-08-07

## 2020-08-07 PROBLEM — N28.9 RENAL IMPAIRMENT: Status: ACTIVE | Noted: 2020-08-07

## 2020-08-07 NOTE — ASSESSMENT & PLAN NOTE
Creatinine 0.9     Likely CKD 3       I  suggest monitoring renal function, in put and out put status gilberto-operatively. I  suggest avoiding nephrotoxic medication including NSAIDs, COX2 inhibitors, intravenous contrast agent,avoiding hypotension to prevent further renal impairment.       She is at risk of acute kidney injury from reduced intra vascular volume      Deleterious effects NSAID's , Beneficial effects of Hydration discussed   Tylenol as needed for pain

## 2020-08-07 NOTE — ASSESSMENT & PLAN NOTE
Hyponatremia- 128  Drinks 4-5 bottles , 16 Oz each   Likely cause is third spacing - effectively Hypovolemic   Could be from the tumor   Suggested protein intake   -  Lab from 8/10 /2020 showed resolution of hyponatremia

## 2020-08-08 ENCOUNTER — TELEPHONE (OUTPATIENT)
Dept: TRANSPLANT | Facility: CLINIC | Age: 85
End: 2020-08-08

## 2020-08-08 DIAGNOSIS — Z01.818 PRE-OP TESTING: Primary | ICD-10-CM

## 2020-08-08 NOTE — TELEPHONE ENCOUNTER
Called patient to notify of labs, ekg and xray added to her schedule for Monday, August 10. Patient verbalized acknowledgment.

## 2020-08-10 ENCOUNTER — HOSPITAL ENCOUNTER (OUTPATIENT)
Dept: CARDIOLOGY | Facility: CLINIC | Age: 85
Discharge: HOME | End: 2020-08-10
Payer: MEDICARE

## 2020-08-10 ENCOUNTER — HOSPITAL ENCOUNTER (OUTPATIENT)
Dept: CARDIOLOGY | Facility: HOSPITAL | Age: 85
Discharge: HOME | End: 2020-08-10
Attending: TRANSPLANT SURGERY
Payer: MEDICARE

## 2020-08-10 ENCOUNTER — TUMOR BOARD CONFERENCE (OUTPATIENT)
Dept: SURGERY | Facility: CLINIC | Age: 85
End: 2020-08-10

## 2020-08-10 ENCOUNTER — HOSPITAL ENCOUNTER (OUTPATIENT)
Dept: RADIOLOGY | Facility: HOSPITAL | Age: 85
Discharge: HOME | End: 2020-08-10
Attending: TRANSPLANT SURGERY
Payer: MEDICARE

## 2020-08-10 VITALS
RESPIRATION RATE: 16 BRPM | BODY MASS INDEX: 37.93 KG/M2 | WEIGHT: 236 LBS | HEIGHT: 66 IN | SYSTOLIC BLOOD PRESSURE: 98 MMHG | DIASTOLIC BLOOD PRESSURE: 64 MMHG

## 2020-08-10 DIAGNOSIS — Z01.818 PRE-OP TESTING: ICD-10-CM

## 2020-08-10 LAB
ASCENDING AORTA: 2.88 CM
BSA FOR ECHO PROCEDURE: 2.23 M2
CV ECHO LV RWT: 0.37 CM
CV STRESS BASE HR: 89 BPM
DIASTOLIC BLOOD PRESSURE: 86 MMHG
DOP CALC LVOT AREA: 3.2 CM2
DOP CALC LVOT DIAMETER: 2.01 CM
DOP CALC LVOT PEAK VEL: 1.22 M/S
DOP CALC LVOT STROKE VOLUME: 68 CM3
DOP CALCLVOT PEAK VEL VTI: 21.44 CM
E WAVE DECELERATION TIME: 320.32 MSEC
E/A RATIO: 0.63
E/E' RATIO: 6.5 M/S
ECHO LV POSTERIOR WALL: 0.79 CM (ref 0.6–1.1)
FRACTIONAL SHORTENING: 43 % (ref 28–44)
INTERVENTRICULAR SEPTUM: 0.88 CM (ref 0.6–1.1)
IVRT: 137.01 MSEC
LA MAJOR: 4.65 CM
LA MINOR: 4.84 CM
LA WIDTH: 3.2 CM
LEFT ATRIUM SIZE: 3.42 CM
LEFT ATRIUM VOLUME INDEX: 20.6 ML/M2
LEFT ATRIUM VOLUME: 44.12 CM3
LEFT INTERNAL DIMENSION IN SYSTOLE: 2.43 CM (ref 2.1–4)
LEFT VENTRICLE DIASTOLIC VOLUME INDEX: 38.18 ML/M2
LEFT VENTRICLE DIASTOLIC VOLUME: 81.94 ML
LEFT VENTRICLE MASS INDEX: 52 G/M2
LEFT VENTRICLE SYSTOLIC VOLUME INDEX: 9.7 ML/M2
LEFT VENTRICLE SYSTOLIC VOLUME: 20.8 ML
LEFT VENTRICULAR INTERNAL DIMENSION IN DIASTOLE: 4.28 CM (ref 3.5–6)
LEFT VENTRICULAR MASS: 110.63 G
LV LATERAL E/E' RATIO: 5.2 M/S
LV SEPTAL E/E' RATIO: 8.67 M/S
MV PEAK A VEL: 0.82 M/S
MV PEAK E VEL: 0.52 M/S
MV STENOSIS PRESSURE HALF TIME: 92.89 MS
MV VALVE AREA P 1/2 METHOD: 2.37 CM2
OHS CV CPX 1 MINUTE RECOVERY HEART RATE: 121 BPM
OHS CV CPX 85 PERCENT MAX PREDICTED HEART RATE MALE: 113
OHS CV CPX MAX PREDICTED HEART RATE: 133
OHS CV CPX PATIENT IS FEMALE: 1
OHS CV CPX PATIENT IS MALE: 0
OHS CV CPX PEAK DIASTOLIC BLOOD PRESSURE: 38 MMHG
OHS CV CPX PEAK HEAR RATE: 121 BPM
OHS CV CPX PEAK RATE PRESSURE PRODUCT: 9680
OHS CV CPX PEAK SYSTOLIC BLOOD PRESSURE: 80 MMHG
OHS CV CPX PERCENT MAX PREDICTED HEART RATE ACHIEVED: 91
OHS CV CPX RATE PRESSURE PRODUCT PRESENTING: 9968
PISA TR MAX VEL: 2.45 M/S
PULM VEIN S/D RATIO: 1.53
PV PEAK D VEL: 0.32 M/S
PV PEAK S VEL: 0.49 M/S
RA MAJOR: 4.27 CM
RA PRESSURE: 3 MMHG
RA WIDTH: 2.81 CM
RIGHT VENTRICULAR END-DIASTOLIC DIMENSION: 3.9 CM
RV TISSUE DOPPLER FREE WALL SYSTOLIC VELOCITY 1 (APICAL 4 CHAMBER VIEW): 12.44 CM/S
SINUS: 3.41 CM
STJ: 2.57 CM
SYSTOLIC BLOOD PRESSURE: 112 MMHG
TDI LATERAL: 0.1 M/S
TDI SEPTAL: 0.06 M/S
TDI: 0.08 M/S
TR MAX PG: 24 MMHG
TRICUSPID ANNULAR PLANE SYSTOLIC EXCURSION: 2.36 CM
TV REST PULMONARY ARTERY PRESSURE: 27 MMHG

## 2020-08-10 PROCEDURE — 71046 X-RAY EXAM CHEST 2 VIEWS: CPT | Mod: TC,FY

## 2020-08-10 PROCEDURE — 93010 ELECTROCARDIOGRAM REPORT: CPT | Mod: S$GLB,,, | Performed by: INTERNAL MEDICINE

## 2020-08-10 PROCEDURE — 93010 EKG 12-LEAD: ICD-10-PCS | Mod: S$GLB,,, | Performed by: INTERNAL MEDICINE

## 2020-08-10 PROCEDURE — 93351 STRESS TTE COMPLETE: CPT

## 2020-08-10 PROCEDURE — 93005 EKG 12-LEAD: ICD-10-PCS | Mod: S$GLB,,, | Performed by: TRANSPLANT SURGERY

## 2020-08-10 PROCEDURE — 93005 ELECTROCARDIOGRAM TRACING: CPT | Mod: S$GLB,,, | Performed by: TRANSPLANT SURGERY

## 2020-08-10 PROCEDURE — 71046 X-RAY EXAM CHEST 2 VIEWS: CPT | Mod: 26,,, | Performed by: RADIOLOGY

## 2020-08-10 PROCEDURE — 71046 XR CHEST PA AND LATERAL: ICD-10-PCS | Mod: 26,,, | Performed by: RADIOLOGY

## 2020-08-10 PROCEDURE — 63600175 PHARM REV CODE 636 W HCPCS: Performed by: TRANSPLANT SURGERY

## 2020-08-10 RX ORDER — ATROPINE SULFATE 0.1 MG/ML
1 INJECTION INTRAVENOUS
Status: DISCONTINUED | OUTPATIENT
Start: 2020-08-10 | End: 2020-08-10

## 2020-08-10 RX ORDER — DOBUTAMINE HYDROCHLORIDE 200 MG/100ML
10 INJECTION INTRAVENOUS
Status: COMPLETED | OUTPATIENT
Start: 2020-08-10 | End: 2020-08-10

## 2020-08-10 RX ADMIN — DOBUTAMINE HYDROCHLORIDE 10 MCG/KG/MIN: 200 INJECTION INTRAVENOUS at 03:08

## 2020-08-10 NOTE — PROGRESS NOTES
[facility] UGI MULTIDISCIPLINARY TUMOR BOARD  PATIENT REVIEW FORM   ____________________________________________________________    CLINIC #:  [id number]  DATE: 8/10/2020    DIAGNOSIS: liver/ retroperitoneal mass    PRESENTER: Seal    PATIENT SUMMARY:   This relatively active 85 y/o female with progressive weight gain over past 2-3 years. She noticed abd discomfort, venous stasis in BLE so imaging obtained.   Reviewed CT scan 5/2020 - large, hypoattentuating mass, appears to arise from liver, noted peripheral calcifications with peripheral enhancement. Noted cystic components. Stable over the past few months on CT and MRI. Tumor markers WNL.  No tissue bx to date.    BOARD RECOMMENDATIONS:   High risk given her age, but proceed with surgical resection     CONSULT NEEDED:     [x] Surgery    [] Hem/Onc    [] Rad/Onc    [] Dietary           [] Social Service    [] Psychology       [] AES  [] Radiology        [] Local recurrence     [] Regional recurrence     [] Distant recurrence Metastatic site(s): none         [x] Anh'l Treatment Guidelines reviewed and care planned is consistent with guidelines.         (i.e., NCCN, NCI, PD, ACO, AUA, etc.)    PRESENTATION AT CANCER CONFERENCE:         [x] Prospective    [] Retrospective     [] Follow-Up

## 2020-08-10 NOTE — PROGRESS NOTES
Patient, Magaly Hutchinson (MRN #[id number]), presented with a recorded BMI of 36.15 kg/m^2 and a documented comorbidity(s):  - Diabetes Mellitus Type 2  - Hypertension  - Hyperlipidemia  to which the severe obesity is a contributing factor. This is consistent with the definition of severe obesity (BMI 35.0-39.9) with comorbidity (ICD-10 E66.01, Z68.35). The patient's severe obesity was monitored, evaluated, addressed and/or treated. This addendum to the medical record is made on 08/10/2020.

## 2020-08-11 ENCOUNTER — CONFERENCE (OUTPATIENT)
Dept: TRANSPLANT | Facility: CLINIC | Age: 85
End: 2020-08-11

## 2020-08-11 NOTE — TELEPHONE ENCOUNTER
Patient: Magaly Hutchinson       MRN: [id number]      : 1935     Age: 84 y.o.  [address]  [place] [address]     Provider: Seal     Urgency of review: urgent     Patient Transplant Status: Not a candidate     Reason for presentation: Indeterminate lesion     Clinical Summary: 84 F with large right abdominal mass. Pt reports increasing abdominal girth for past few years, progressive lower extremity edema. Attributed to weight gain. Also reports having known about cyst on her liver for several years.     Imaging to be reviewed: CT/MRI     HCC Treatment History: N/A     ABO:      Platelets:         Lab Results   Component Value Date/Time      2014 07:49 PM     Creatinine:         Lab Results   Component Value Date/Time     CREATININE 0.8 2014 07:49 PM     Bilirubin:         Lab Results   Component Value Date/Time     BILITOT 0.6 2014 07:49 PM     AFP Last 3 each if available: No results found for: AFP, EXTAFP     MELD: Computed MELD-Na score unavailable. Necessary lab results were not found in the last year.  Computed MELD score unavailable. Necessary lab results were not found in the last year.     Plan: Large cystic mass with mildly thickened irregular rim and several thin septations, which occupies the majority of the right hepatic lobe and contributes to mass effect on the left hepatic lobe and RUQ.  Aorta, celiac, SMA, RK, PV, all displaced to the left.  Several scattered calcifications on the left.  Could be aspirated or biopsied prior to resection if needed.          Follow-up Provider:Dr Pressley

## 2020-08-19 ENCOUNTER — ANESTHESIA EVENT (OUTPATIENT)
Dept: SURGERY | Facility: HOSPITAL | Age: 85
DRG: 982 | End: 2020-08-19
Payer: MEDICARE

## 2020-08-20 ENCOUNTER — TELEPHONE (OUTPATIENT)
Dept: TRANSPLANT | Facility: CLINIC | Age: 85
End: 2020-08-20

## 2020-08-20 NOTE — TELEPHONE ENCOUNTER
Called patient to remind her to check in at the 2nd floor surgery center on Monday for her surgery for 0500. Patient aware that surgery is scheduled for 0700 and she is to arrive 2 hours prior to surgery. Patient instructed to have nothing to eat or drink after midnight the night before and to bathe with Hibiclens or Dial Gold soap the night before and the morning of the surgery. Patient verbalized understanding. Patient will call should she have any questions prior to the surgery.

## 2020-08-21 ENCOUNTER — LAB VISIT (OUTPATIENT)
Dept: URGENT CARE | Facility: CLINIC | Age: 85
DRG: 982 | End: 2020-08-21
Payer: MEDICARE

## 2020-08-21 DIAGNOSIS — Z01.818 PRE-OP TESTING: ICD-10-CM

## 2020-08-21 PROCEDURE — U0003 INFECTIOUS AGENT DETECTION BY NUCLEIC ACID (DNA OR RNA); SEVERE ACUTE RESPIRATORY SYNDROME CORONAVIRUS 2 (SARS-COV-2) (CORONAVIRUS DISEASE [COVID-19]), AMPLIFIED PROBE TECHNIQUE, MAKING USE OF HIGH THROUGHPUT TECHNOLOGIES AS DESCRIBED BY CMS-2020-01-R: HCPCS

## 2020-08-21 RX ORDER — FUROSEMIDE 20 MG/1
TABLET ORAL
COMMUNITY
Start: 2020-05-28 | End: 2021-07-29

## 2020-08-21 RX ORDER — METFORMIN HYDROCHLORIDE 500 MG/1
TABLET ORAL
Status: ON HOLD | COMMUNITY
End: 2020-08-31 | Stop reason: HOSPADM

## 2020-08-21 NOTE — ANESTHESIA PREPROCEDURE EVALUATION
[facility]  Anesthesia Pre-Operative Evaluation         Patient Name: Magaly Hutchinson  YOB: 1935  MRN: [id number]    SUBJECTIVE:     08/21/2020    Pre-operative evaluation for Procedure(s) (LRB):  EXCISION, LIVER (N/A)    Magaly Hutchinson is a 84 y.o. female with HTN, DM type 2, and a large R lobe liver mass.    Patient now presents for the above procedure(s).      Previous airway:   None documented.      Patient Active Problem List   Diagnosis    Liver mass, right lobe    Edema    Long term (current) use of antithrombotics/antiplatelets    Essential hypertension    Type 2 diabetes mellitus, without long-term current use of insulin    HLD (hyperlipidemia)    Constipation    Weight loss    History of bilateral knee replacement    Anemia    Sciatica of right side    H/O: hysterectomy    Hyponatremia    Renal impairment    Serum albumin decreased    Malnutrition       Review of patient's allergies indicates:  No Known Allergies    No current facility-administered medications on file prior to encounter.      Current Outpatient Medications on File Prior to Encounter   Medication Sig Dispense Refill    amlodipine (NORVASC) 10 MG tablet TAKE 1 TABLET BY MOUTH DAILY 90 tablet 3    aspirin (ECOTRIN) 81 MG EC tablet Take 81 mg by mouth every other day.       gabapentin (NEURONTIN) 300 MG capsule Take 300 mg by mouth 3 (three) times daily.      metformin (FORTAMET) 500 mg 24 hr tablet Take 500 mg by mouth daily with breakfast.      metFORMIN (GLUCOPHAGE) 500 MG tablet Take 500 mg by mouth daily with dinner or evening meal.      metformin (GLUCOPHAGE-XR) 500 MG 24 hr tablet TAKE 1 TABLET BY MOUTH EVERY DAY FOR DIABETES. TAKE WITH EVENING MEAL 90 tablet 2    naproxen sodium (ANAPROX) 220 MG tablet Take 220 mg by mouth 2 (two) times daily with meals.      niacin 500 MG CpSR Take 250 mg by mouth every evening.      pravastatin (PRAVACHOL) 20 MG tablet TAKE 1 TABLET BY MOUTH AT  BEDTIME FOR CHOLESTEROL 90 tablet 2    traMADoL (ULTRAM) 50 mg tablet Take 1 tablet (50 mg total) by mouth every 12 (twelve) hours as needed for Pain. 20 tablet 0       Past Surgical History:   Procedure Laterality Date    BLADDER REPAIR      lift     CHOLECYSTECTOMY      2006/2007 ?    HYSTERECTOMY      1972    JOINT REPLACEMENT      2003    KNEE SURGERY         Social History     Socioeconomic History    Marital status:      Spouse name: Not on file    Number of children: Not on file    Years of education: Not on file    Highest education level: Not on file   Occupational History    Not on file   Social Needs    Financial resource strain: Not on file    Food insecurity     Worry: Not on file     Inability: Not on file    Transportation needs     Medical: Not on file     Non-medical: Not on file   Tobacco Use    Smoking status: Never Smoker    Smokeless tobacco: Never Used   Substance and Sexual Activity    Alcohol use: No    Drug use: Never    Sexual activity: Not on file   Lifestyle    Physical activity     Days per week: Not on file     Minutes per session: Not on file    Stress: Not on file   Relationships    Social connections     Talks on phone: Not on file     Gets together: Not on file     Attends Restorationism service: Not on file     Active member of club or organization: Not on file     Attends meetings of clubs or organizations: Not on file     Relationship status: Not on file   Other Topics Concern    Not on file   Social History Narrative    Not on file       OBJECTIVE:     Significant Labs:  Lab Results   Component Value Date    WBC 9.43 08/06/2020    HGB 10.4 (L) 08/06/2020    HCT 35.6 (L) 08/06/2020     08/06/2020    ALT <5 (L) 08/06/2020    AST 9 (L) 08/06/2020     08/10/2020    K 4.7 08/10/2020     08/10/2020    CREATININE 0.8 08/10/2020    BUN 17 08/10/2020    CO2 28 08/10/2020    INR 1.1 08/06/2020    HGBA1C 5.1 08/06/2020         Diagnostic  Studies:  No relevant studies.      Cardiac Studies:  EKG (8/10/2020):   Vent. Rate : 088 BPM     Atrial Rate : 088 BPM      P-R Int : 180 ms          QRS Dur : 086 ms       QT Int : 366 ms       P-R-T Axes : 077 -20 030 degrees      QTc Int : 442 ms   Sinus rhythm with Fusion complexes   Moderate voltage criteria for LVH, may be normal variant   Nonspecific ST and T wave abnormality   Abnormal ECG   No previous ECGs available   Confirmed by Jairo Mcmillan MD ([id number]) on 8/10/2020 2:23:12 PM     Stress Echo (8/10/2020):  · Normal left ventricular systolic function. The estimated ejection fraction is 60%.  · Normal right ventricular systolic function.  · Normal LV diastolic function.  · Significant ascites present.  · The estimated PA systolic pressure is 27 mmHg.  · Normal central venous pressure (3 mmHg).  · The ECG portion of this study is negative for myocardial ischemia.  · The stress echo portion of this study is negative for myocardial ischemia.  · Overall, this study is negative for myocardial ischemia.      ASSESSMENT/PLAN:     Anesthesia Evaluation    I have reviewed the Patient Summary Reports.    I have reviewed the Nursing Notes. I have reviewed the NPO Status.   I have reviewed the Medications.     Review of Systems  Anesthesia Hx:  No problems with previous Anesthesia   Denies Personal Hx of Anesthesia complications.   Social:  Non-Smoker  Denies Tobacco Use.   Hematology/Oncology:         -- Abnormal lesion or mass   Cardiovascular:   Hypertension, well controlled Denies MI.  Denies CAD.    Denies CABG/stent.  Denies Dysrhythmias.  hyperlipidemia  Denies Coronary Artery Disease.  Hypertension, Essential Hypertension , Pt in Pre-HTN range, systolic 130 - 139 or diastolic 85 - 89, Well Controlled on Rx    Pulmonary:   Denies COPD.  Denies Asthma.  Denies Shortness of breath.  Denies Asthma.  Denies Chronic Obstructive Pulmonary Disease (COPD).    Renal/:   Denies Chronic Renal Disease.   Denies Kidney  Function/Disease    Hepatic/GI:   Denies GERD. Liver Disease, Liver mass Denies Esophageal / Stomach Disorders  Liver Disease Hepatic Neoplasm    Neurological:   Denies TIA. Denies CVA. Denies Seizures.  Denies Seizure Disorder  Denies CVA - Cerebrovasular Accident  Denies TIA - Transient Ischemic Attack    Endocrine:   Diabetes, well controlled, type 2 Denies Hypothyroidism.  Diabetes, Type 2 Diabetes , controlled by oral hypoglycemics. , most recent HgA1c value was 5.1 on 8/6/20.  Denies Thyroid Disease  Metabolic Disorders, Hyperlipoproteinemia, hypercholesterolemia, controlled on medication, Obesity / BMI > 30      Physical Exam  General:  Obesity    Airway/Jaw/Neck:  Airway Findings: Mouth Opening: Normal Tongue: Normal  General Airway Assessment: Adult  Mallampati: III  TM Distance: < 4 cm  Jaw/Neck Findings:  Neck ROM: Extension Decreased, Mod.      Dental:  Dental Findings: Edentulous   Chest/Lungs:  Chest/Lungs Findings: Normal Respiratory Rate, Clear to auscultation     Heart/Vascular:  Heart Findings: Rate: Normal  Rhythm: Regular Rhythm  Sounds: Normal  Heart murmur: negative    Abdomen:  Abdomen Findings:  Abdomnal Mass       Mental Status:  Mental Status Findings:  Cooperative, Alert and Oriented         Anesthesia Plan  Type of Anesthesia, risks & benefits discussed:  Anesthesia Type:  general  Patient's Preference:   Intra-op Monitoring Plan: standard ASA monitors, central line and arterial line  Intra-op Monitoring Plan Comments:   Post Op Pain Control Plan: multimodal analgesia, IV/PO Opioids PRN and per primary service following discharge from PACU  Post Op Pain Control Plan Comments:   Induction:   IV  Beta Blocker:  Patient is not currently on a Beta-Blocker (No further documentation required).       Informed Consent: Patient understands risks and agrees with Anesthesia plan.  Questions answered. Anesthesia consent signed with patient.  ASA Score: 3     Day of Surgery Review of History &  Physical:    H&P update referred to the surgeon.         Ready For Surgery From Anesthesia Perspective.

## 2020-08-22 LAB — SARS-COV-2 RNA RESP QL NAA+PROBE: NOT DETECTED

## 2020-08-24 ENCOUNTER — HOSPITAL ENCOUNTER (INPATIENT)
Facility: HOSPITAL | Age: 85
LOS: 7 days | Discharge: HOME | DRG: 982 | End: 2020-08-31
Attending: TRANSPLANT SURGERY | Admitting: TRANSPLANT SURGERY
Payer: MEDICARE

## 2020-08-24 ENCOUNTER — ANESTHESIA (OUTPATIENT)
Dept: SURGERY | Facility: HOSPITAL | Age: 85
DRG: 982 | End: 2020-08-24
Payer: MEDICARE

## 2020-08-24 DIAGNOSIS — R60.9 EDEMA, UNSPECIFIED TYPE: ICD-10-CM

## 2020-08-24 DIAGNOSIS — D64.9 ANEMIA, UNSPECIFIED TYPE: ICD-10-CM

## 2020-08-24 DIAGNOSIS — E83.39 HYPOPHOSPHATEMIA: ICD-10-CM

## 2020-08-24 DIAGNOSIS — K59.00 CONSTIPATION, UNSPECIFIED CONSTIPATION TYPE: ICD-10-CM

## 2020-08-24 DIAGNOSIS — R16.0 LIVER MASS, RIGHT LOBE: Primary | ICD-10-CM

## 2020-08-24 LAB
ABO + RH BLD: NORMAL
ALBUMIN SERPL BCP-MCNC: 2.6 G/DL (ref 3.5–5.2)
ALBUMIN SERPL BCP-MCNC: 2.8 G/DL (ref 3.5–5.2)
ALBUMIN SERPL BCP-MCNC: 3 G/DL (ref 3.5–5.2)
ALLENS TEST: ABNORMAL
ALP SERPL-CCNC: 41 U/L (ref 55–135)
ALP SERPL-CCNC: 45 U/L (ref 55–135)
ALP SERPL-CCNC: 46 U/L (ref 55–135)
ALT SERPL W/O P-5'-P-CCNC: 29 U/L (ref 10–44)
ALT SERPL W/O P-5'-P-CCNC: 31 U/L (ref 10–44)
ALT SERPL W/O P-5'-P-CCNC: 38 U/L (ref 10–44)
ANION GAP SERPL CALC-SCNC: 11 MMOL/L (ref 8–16)
ANION GAP SERPL CALC-SCNC: 11 MMOL/L (ref 8–16)
ANION GAP SERPL CALC-SCNC: 15 MMOL/L (ref 8–16)
ANION GAP SERPL CALC-SCNC: 7 MMOL/L (ref 8–16)
ANION GAP SERPL CALC-SCNC: 8 MMOL/L (ref 8–16)
APPEARANCE FLD: NORMAL
APTT BLDCRRT: 36 SEC (ref 21–32)
APTT BLDCRRT: 67.3 SEC (ref 21–32)
AST SERPL-CCNC: 100 U/L (ref 10–40)
AST SERPL-CCNC: 105 U/L (ref 10–40)
AST SERPL-CCNC: 81 U/L (ref 10–40)
BASOPHILS # BLD AUTO: 0.01 K/UL (ref 0–0.2)
BASOPHILS # BLD AUTO: 0.02 K/UL (ref 0–0.2)
BASOPHILS # BLD AUTO: 0.02 K/UL (ref 0–0.2)
BASOPHILS NFR BLD: 0.1 % (ref 0–1.9)
BASOPHILS NFR BLD: 0.2 % (ref 0–1.9)
BASOPHILS NFR BLD: 0.2 % (ref 0–1.9)
BILIRUB SERPL-MCNC: 3.2 MG/DL (ref 0.1–1)
BILIRUB SERPL-MCNC: 3.4 MG/DL (ref 0.1–1)
BILIRUB SERPL-MCNC: 4.3 MG/DL (ref 0.1–1)
BLD GP AB SCN CELLS X3 SERPL QL: NORMAL
BLD PROD TYP BPU: NORMAL
BLOOD UNIT EXPIRATION DATE: NORMAL
BLOOD UNIT TYPE CODE: 5100
BLOOD UNIT TYPE CODE: 600
BLOOD UNIT TYPE CODE: 6200
BLOOD UNIT TYPE: NORMAL
BODY FLD TYPE: NORMAL
BUN SERPL-MCNC: 15 MG/DL (ref 8–23)
BUN SERPL-MCNC: 16 MG/DL (ref 8–23)
BUN SERPL-MCNC: 17 MG/DL (ref 8–23)
BUN SERPL-MCNC: 18 MG/DL (ref 8–23)
BUN SERPL-MCNC: 19 MG/DL (ref 8–23)
CALCIUM SERPL-MCNC: 10.3 MG/DL (ref 8.7–10.5)
CALCIUM SERPL-MCNC: 8.2 MG/DL (ref 8.7–10.5)
CALCIUM SERPL-MCNC: 8.6 MG/DL (ref 8.7–10.5)
CALCIUM SERPL-MCNC: 9.1 MG/DL (ref 8.7–10.5)
CALCIUM SERPL-MCNC: 9.8 MG/DL (ref 8.7–10.5)
CHLORIDE SERPL-SCNC: 107 MMOL/L (ref 95–110)
CHLORIDE SERPL-SCNC: 108 MMOL/L (ref 95–110)
CHLORIDE SERPL-SCNC: 109 MMOL/L (ref 95–110)
CHLORIDE SERPL-SCNC: 109 MMOL/L (ref 95–110)
CHLORIDE SERPL-SCNC: 110 MMOL/L (ref 95–110)
CO2 SERPL-SCNC: 18 MMOL/L (ref 23–29)
CO2 SERPL-SCNC: 19 MMOL/L (ref 23–29)
CO2 SERPL-SCNC: 21 MMOL/L (ref 23–29)
CO2 SERPL-SCNC: 23 MMOL/L (ref 23–29)
CO2 SERPL-SCNC: 24 MMOL/L (ref 23–29)
CODING SYSTEM: NORMAL
COLOR FLD: NORMAL
CREAT SERPL-MCNC: 0.6 MG/DL (ref 0.5–1.4)
CREAT SERPL-MCNC: 0.7 MG/DL (ref 0.5–1.4)
CREAT SERPL-MCNC: 0.8 MG/DL (ref 0.5–1.4)
DELSYS: ABNORMAL
DIFFERENTIAL METHOD: ABNORMAL
DISPENSE STATUS: NORMAL
EOSINOPHIL # BLD AUTO: 0 K/UL (ref 0–0.5)
EOSINOPHIL # BLD AUTO: 0.1 K/UL (ref 0–0.5)
EOSINOPHIL NFR BLD: 0 % (ref 0–8)
EOSINOPHIL NFR BLD: 0.6 % (ref 0–8)
EOSINOPHIL NFR BLD: 0.7 % (ref 0–8)
ERYTHROCYTE [DISTWIDTH] IN BLOOD BY AUTOMATED COUNT: 15.5 % (ref 11.5–14.5)
ERYTHROCYTE [DISTWIDTH] IN BLOOD BY AUTOMATED COUNT: 15.6 % (ref 11.5–14.5)
ERYTHROCYTE [DISTWIDTH] IN BLOOD BY AUTOMATED COUNT: 15.8 % (ref 11.5–14.5)
ERYTHROCYTE [DISTWIDTH] IN BLOOD BY AUTOMATED COUNT: 15.9 % (ref 11.5–14.5)
ERYTHROCYTE [DISTWIDTH] IN BLOOD BY AUTOMATED COUNT: 17.8 % (ref 11.5–14.5)
ERYTHROCYTE [SEDIMENTATION RATE] IN BLOOD BY WESTERGREN METHOD: 12 MM/H
EST. GFR  (AFRICAN AMERICAN): >60 ML/MIN/1.73 M^2
EST. GFR  (NON AFRICAN AMERICAN): >60 ML/MIN/1.73 M^2
FIBRINOGEN PPP-MCNC: 108 MG/DL (ref 182–366)
FIBRINOGEN PPP-MCNC: 242 MG/DL (ref 182–366)
FIO2: 100
GLUCOSE SERPL-MCNC: 129 MG/DL (ref 70–110)
GLUCOSE SERPL-MCNC: 130 MG/DL (ref 70–110)
GLUCOSE SERPL-MCNC: 162 MG/DL (ref 70–110)
GLUCOSE SERPL-MCNC: 167 MG/DL (ref 70–110)
GLUCOSE SERPL-MCNC: 167 MG/DL (ref 70–110)
GLUCOSE SERPL-MCNC: 171 MG/DL (ref 70–110)
GLUCOSE SERPL-MCNC: 179 MG/DL (ref 70–110)
GLUCOSE SERPL-MCNC: 194 MG/DL (ref 70–110)
GLUCOSE SERPL-MCNC: 204 MG/DL (ref 70–110)
GLUCOSE SERPL-MCNC: 207 MG/DL (ref 70–110)
GLUCOSE SERPL-MCNC: 85 MG/DL (ref 70–110)
HCO3 UR-SCNC: 18.1 MMOL/L (ref 24–28)
HCO3 UR-SCNC: 18.5 MMOL/L (ref 24–28)
HCO3 UR-SCNC: 19.2 MMOL/L (ref 24–28)
HCO3 UR-SCNC: 20 MMOL/L (ref 24–28)
HCO3 UR-SCNC: 22.3 MMOL/L (ref 24–28)
HCO3 UR-SCNC: 25 MMOL/L (ref 24–28)
HCO3 UR-SCNC: 27 MMOL/L (ref 24–28)
HCT VFR BLD AUTO: 15.6 % (ref 37–48.5)
HCT VFR BLD AUTO: 28 % (ref 37–48.5)
HCT VFR BLD AUTO: 28.6 % (ref 37–48.5)
HCT VFR BLD AUTO: 30 % (ref 37–48.5)
HCT VFR BLD AUTO: 31.3 % (ref 37–48.5)
HCT VFR BLD CALC: 18 %PCV (ref 36–54)
HCT VFR BLD CALC: 20 %PCV (ref 36–54)
HCT VFR BLD CALC: 22 %PCV (ref 36–54)
HCT VFR BLD CALC: 26 %PCV (ref 36–54)
HCT VFR BLD CALC: 26 %PCV (ref 36–54)
HCT VFR BLD CALC: 27 %PCV (ref 36–54)
HGB BLD-MCNC: 10.1 G/DL (ref 12–16)
HGB BLD-MCNC: 4.8 G/DL (ref 12–16)
HGB BLD-MCNC: 9.3 G/DL (ref 12–16)
HGB BLD-MCNC: 9.6 G/DL (ref 12–16)
HGB BLD-MCNC: 9.7 G/DL (ref 12–16)
IMM GRANULOCYTES # BLD AUTO: 0.05 K/UL (ref 0–0.04)
IMM GRANULOCYTES # BLD AUTO: 0.06 K/UL (ref 0–0.04)
IMM GRANULOCYTES # BLD AUTO: 0.12 K/UL (ref 0–0.04)
IMM GRANULOCYTES # BLD AUTO: 0.12 K/UL (ref 0–0.04)
IMM GRANULOCYTES # BLD AUTO: 0.13 K/UL (ref 0–0.04)
IMM GRANULOCYTES NFR BLD AUTO: 0.8 % (ref 0–0.5)
IMM GRANULOCYTES NFR BLD AUTO: 0.8 % (ref 0–0.5)
IMM GRANULOCYTES NFR BLD AUTO: 0.9 % (ref 0–0.5)
INR PPP: 1.3 (ref 0.8–1.2)
INR PPP: 1.6 (ref 0.8–1.2)
LYMPHOCYTES # BLD AUTO: 0.3 K/UL (ref 1–4.8)
LYMPHOCYTES # BLD AUTO: 0.5 K/UL (ref 1–4.8)
LYMPHOCYTES # BLD AUTO: 0.6 K/UL (ref 1–4.8)
LYMPHOCYTES # BLD AUTO: 0.7 K/UL (ref 1–4.8)
LYMPHOCYTES # BLD AUTO: 1.6 K/UL (ref 1–4.8)
LYMPHOCYTES NFR BLD: 12.2 % (ref 18–48)
LYMPHOCYTES NFR BLD: 3.3 % (ref 18–48)
LYMPHOCYTES NFR BLD: 4 % (ref 18–48)
LYMPHOCYTES NFR BLD: 5.4 % (ref 18–48)
LYMPHOCYTES NFR BLD: 9.8 % (ref 18–48)
MAGNESIUM SERPL-MCNC: 2.1 MG/DL (ref 1.6–2.6)
MCH RBC QN AUTO: 28.2 PG (ref 27–31)
MCH RBC QN AUTO: 29.7 PG (ref 27–31)
MCH RBC QN AUTO: 30.1 PG (ref 27–31)
MCH RBC QN AUTO: 30.2 PG (ref 27–31)
MCH RBC QN AUTO: 30.6 PG (ref 27–31)
MCHC RBC AUTO-ENTMCNC: 30.8 G/DL (ref 32–36)
MCHC RBC AUTO-ENTMCNC: 32.3 G/DL (ref 32–36)
MCHC RBC AUTO-ENTMCNC: 32.3 G/DL (ref 32–36)
MCHC RBC AUTO-ENTMCNC: 33.2 G/DL (ref 32–36)
MCHC RBC AUTO-ENTMCNC: 33.6 G/DL (ref 32–36)
MCV RBC AUTO: 90 FL (ref 82–98)
MCV RBC AUTO: 90 FL (ref 82–98)
MCV RBC AUTO: 92 FL (ref 82–98)
MCV RBC AUTO: 93 FL (ref 82–98)
MCV RBC AUTO: 95 FL (ref 82–98)
MODE: ABNORMAL
MONOCYTES # BLD AUTO: 0.3 K/UL (ref 0.3–1)
MONOCYTES # BLD AUTO: 0.3 K/UL (ref 0.3–1)
MONOCYTES # BLD AUTO: 0.6 K/UL (ref 0.3–1)
MONOCYTES # BLD AUTO: 0.6 K/UL (ref 0.3–1)
MONOCYTES # BLD AUTO: 0.8 K/UL (ref 0.3–1)
MONOCYTES NFR BLD: 2.4 % (ref 4–15)
MONOCYTES NFR BLD: 3.8 % (ref 4–15)
MONOCYTES NFR BLD: 4.5 % (ref 4–15)
MONOCYTES NFR BLD: 5.6 % (ref 4–15)
MONOCYTES NFR BLD: 9.2 % (ref 4–15)
NEUTROPHILS # BLD AUTO: 11.2 K/UL (ref 1.8–7.7)
NEUTROPHILS # BLD AUTO: 12.6 K/UL (ref 1.8–7.7)
NEUTROPHILS # BLD AUTO: 13.3 K/UL (ref 1.8–7.7)
NEUTROPHILS # BLD AUTO: 5.1 K/UL (ref 1.8–7.7)
NEUTROPHILS # BLD AUTO: 5.6 K/UL (ref 1.8–7.7)
NEUTROPHILS NFR BLD: 83.7 % (ref 38–73)
NEUTROPHILS NFR BLD: 84.4 % (ref 38–73)
NEUTROPHILS NFR BLD: 84.7 % (ref 38–73)
NEUTROPHILS NFR BLD: 89.5 % (ref 38–73)
NEUTROPHILS NFR BLD: 91.2 % (ref 38–73)
NEUTROPHILS NFR FLD MANUAL: 100 %
NRBC BLD-RTO: 0 /100 WBC
NUM UNITS TRANS FFP: NORMAL
PCO2 BLDA: 31.5 MMHG (ref 35–45)
PCO2 BLDA: 35.7 MMHG (ref 35–45)
PCO2 BLDA: 35.8 MMHG (ref 35–45)
PCO2 BLDA: 36.7 MMHG (ref 35–45)
PCO2 BLDA: 37.7 MMHG (ref 35–45)
PCO2 BLDA: 40.8 MMHG (ref 35–45)
PCO2 BLDA: 45.5 MMHG (ref 35–45)
PEEP: 5
PH SMN: 7.25 [PH] (ref 7.35–7.45)
PH SMN: 7.31 [PH] (ref 7.35–7.45)
PH SMN: 7.32 [PH] (ref 7.35–7.45)
PH SMN: 7.38 [PH] (ref 7.35–7.45)
PH SMN: 7.4 [PH] (ref 7.35–7.45)
PH SMN: 7.41 [PH] (ref 7.35–7.45)
PH SMN: 7.45 [PH] (ref 7.35–7.45)
PLATELET # BLD AUTO: 117 K/UL (ref 150–350)
PLATELET # BLD AUTO: 120 K/UL (ref 150–350)
PLATELET # BLD AUTO: 135 K/UL (ref 150–350)
PLATELET # BLD AUTO: 145 K/UL (ref 150–350)
PLATELET # BLD AUTO: 78 K/UL (ref 150–350)
PLATELET BLD QL SMEAR: ABNORMAL
PMV BLD AUTO: 10.1 FL (ref 9.2–12.9)
PMV BLD AUTO: 10.1 FL (ref 9.2–12.9)
PMV BLD AUTO: 10.3 FL (ref 9.2–12.9)
PMV BLD AUTO: 10.3 FL (ref 9.2–12.9)
PMV BLD AUTO: 10.9 FL (ref 9.2–12.9)
PO2 BLDA: 100 MMHG (ref 80–100)
PO2 BLDA: 116 MMHG (ref 80–100)
PO2 BLDA: 288 MMHG (ref 80–100)
PO2 BLDA: 433 MMHG (ref 80–100)
PO2 BLDA: 94 MMHG (ref 80–100)
PO2 BLDA: 94 MMHG (ref 80–100)
PO2 BLDA: 96 MMHG (ref 80–100)
POC BE: -2 MMOL/L
POC BE: -5 MMOL/L
POC BE: -7 MMOL/L
POC BE: -8 MMOL/L
POC BE: -9 MMOL/L
POC BE: 1 MMOL/L
POC BE: 2 MMOL/L
POC IONIZED CALCIUM: 0.83 MMOL/L (ref 1.06–1.42)
POC IONIZED CALCIUM: 1.04 MMOL/L (ref 1.06–1.42)
POC IONIZED CALCIUM: 1.09 MMOL/L (ref 1.06–1.42)
POC IONIZED CALCIUM: 1.13 MMOL/L (ref 1.06–1.42)
POC IONIZED CALCIUM: 1.24 MMOL/L (ref 1.06–1.42)
POC IONIZED CALCIUM: 1.25 MMOL/L (ref 1.06–1.42)
POC SATURATED O2: 100 % (ref 95–100)
POC SATURATED O2: 100 % (ref 95–100)
POC SATURATED O2: 97 % (ref 95–100)
POC SATURATED O2: 98 % (ref 95–100)
POC SATURATED O2: 98 % (ref 95–100)
POC TCO2: 19 MMOL/L (ref 23–27)
POC TCO2: 20 MMOL/L (ref 23–27)
POC TCO2: 20 MMOL/L (ref 23–27)
POC TCO2: 21 MMOL/L (ref 23–27)
POC TCO2: 23 MMOL/L (ref 23–27)
POC TCO2: 26 MMOL/L (ref 23–27)
POC TCO2: 28 MMOL/L (ref 23–27)
POCT GLUCOSE: 124 MG/DL (ref 70–110)
POCT GLUCOSE: 164 MG/DL (ref 70–110)
POCT GLUCOSE: 173 MG/DL (ref 70–110)
POCT GLUCOSE: 96 MG/DL (ref 70–110)
POTASSIUM BLD-SCNC: 3.7 MMOL/L (ref 3.5–5.1)
POTASSIUM BLD-SCNC: 4 MMOL/L (ref 3.5–5.1)
POTASSIUM BLD-SCNC: 4.1 MMOL/L (ref 3.5–5.1)
POTASSIUM BLD-SCNC: 4.4 MMOL/L (ref 3.5–5.1)
POTASSIUM BLD-SCNC: 4.5 MMOL/L (ref 3.5–5.1)
POTASSIUM BLD-SCNC: 5.2 MMOL/L (ref 3.5–5.1)
POTASSIUM SERPL-SCNC: 3.9 MMOL/L (ref 3.5–5.1)
POTASSIUM SERPL-SCNC: 4 MMOL/L (ref 3.5–5.1)
POTASSIUM SERPL-SCNC: 4.1 MMOL/L (ref 3.5–5.1)
POTASSIUM SERPL-SCNC: 4.1 MMOL/L (ref 3.5–5.1)
POTASSIUM SERPL-SCNC: 4.3 MMOL/L (ref 3.5–5.1)
PROT SERPL-MCNC: 4.7 G/DL (ref 6–8.4)
PROT SERPL-MCNC: 5 G/DL (ref 6–8.4)
PROT SERPL-MCNC: 5.2 G/DL (ref 6–8.4)
PROTHROMBIN TIME: 13.9 SEC (ref 9–12.5)
PROTHROMBIN TIME: 17.7 SEC (ref 9–12.5)
RBC # BLD AUTO: 1.7 M/UL (ref 4–5.4)
RBC # BLD AUTO: 3.13 M/UL (ref 4–5.4)
RBC # BLD AUTO: 3.18 M/UL (ref 4–5.4)
RBC # BLD AUTO: 3.22 M/UL (ref 4–5.4)
RBC # BLD AUTO: 3.3 M/UL (ref 4–5.4)
SAMPLE: ABNORMAL
SITE: ABNORMAL
SODIUM BLD-SCNC: 140 MMOL/L (ref 136–145)
SODIUM BLD-SCNC: 141 MMOL/L (ref 136–145)
SODIUM SERPL-SCNC: 138 MMOL/L (ref 136–145)
SODIUM SERPL-SCNC: 138 MMOL/L (ref 136–145)
SODIUM SERPL-SCNC: 141 MMOL/L (ref 136–145)
SODIUM SERPL-SCNC: 141 MMOL/L (ref 136–145)
SODIUM SERPL-SCNC: 142 MMOL/L (ref 136–145)
SP02: 100
TRANS ERYTHROCYTES VOL PATIENT: NORMAL ML
TRANS PLATPHERESIS VOL PATIENT: NORMAL ML
TRANS PLATPHERESIS VOL PATIENT: NORMAL ML
UNIT NUMBER: NORMAL
UNIT NUMBER: NORMAL
VT: 400
WBC # BLD AUTO: 13.36 K/UL (ref 3.9–12.7)
WBC # BLD AUTO: 13.83 K/UL (ref 3.9–12.7)
WBC # BLD AUTO: 14.9 K/UL (ref 3.9–12.7)
WBC # BLD AUTO: 6.08 K/UL (ref 3.9–12.7)
WBC # BLD AUTO: 6.6 K/UL (ref 3.9–12.7)
WBC # FLD: 2 /CU MM

## 2020-08-24 PROCEDURE — 88341 IMHCHEM/IMCYTCHM EA ADD ANTB: CPT | Performed by: PATHOLOGY

## 2020-08-24 PROCEDURE — 63600175 PHARM REV CODE 636 W HCPCS: Performed by: STUDENT IN AN ORGANIZED HEALTH CARE EDUCATION/TRAINING PROGRAM

## 2020-08-24 PROCEDURE — P9017 PLASMA 1 DONOR FRZ W/IN 8 HR: HCPCS

## 2020-08-24 PROCEDURE — P9035 PLATELET PHERES LEUKOREDUCED: HCPCS

## 2020-08-24 PROCEDURE — 99900035 HC TECH TIME PER 15 MIN (STAT)

## 2020-08-24 PROCEDURE — 25000003 PHARM REV CODE 250: Performed by: TRANSPLANT SURGERY

## 2020-08-24 PROCEDURE — 85610 PROTHROMBIN TIME: CPT

## 2020-08-24 PROCEDURE — 86920 COMPATIBILITY TEST SPIN: CPT

## 2020-08-24 PROCEDURE — 88342 IMHCHEM/IMCYTCHM 1ST ANTB: CPT | Mod: 26,,, | Performed by: PATHOLOGY

## 2020-08-24 PROCEDURE — 36556 PR INSERT NON-TUNNEL CV CATH 5+ YRS OLD: ICD-10-PCS | Mod: 59,,, | Performed by: ANESTHESIOLOGY

## 2020-08-24 PROCEDURE — 25000003 PHARM REV CODE 250: Performed by: STUDENT IN AN ORGANIZED HEALTH CARE EDUCATION/TRAINING PROGRAM

## 2020-08-24 PROCEDURE — 37000008 HC ANESTHESIA 1ST 15 MINUTES: Performed by: TRANSPLANT SURGERY

## 2020-08-24 PROCEDURE — 88305 TISSUE EXAM BY PATHOLOGIST: ICD-10-PCS | Mod: 26,,, | Performed by: PATHOLOGY

## 2020-08-24 PROCEDURE — 80048 BASIC METABOLIC PNL TOTAL CA: CPT | Mod: 91

## 2020-08-24 PROCEDURE — 88305 TISSUE EXAM BY PATHOLOGIST: CPT | Mod: 26,,, | Performed by: PATHOLOGY

## 2020-08-24 PROCEDURE — 88312 SPECIAL STAINS GROUP 1: CPT | Mod: 26,,, | Performed by: PATHOLOGY

## 2020-08-24 PROCEDURE — 88112 CYTOPATH CELL ENHANCE TECH: CPT | Mod: 26,,, | Performed by: PATHOLOGY

## 2020-08-24 PROCEDURE — 88341 IMHCHEM/IMCYTCHM EA ADD ANTB: CPT | Mod: 26,,, | Performed by: PATHOLOGY

## 2020-08-24 PROCEDURE — 36620 PR INSERT CATH,ART,PERCUT,SHORTTERM: ICD-10-PCS | Mod: 59,,, | Performed by: ANESTHESIOLOGY

## 2020-08-24 PROCEDURE — 80053 COMPREHEN METABOLIC PANEL: CPT | Mod: 91

## 2020-08-24 PROCEDURE — 88342 CHG IMMUNOCYTOCHEMISTRY: ICD-10-PCS | Mod: 26,,, | Performed by: PATHOLOGY

## 2020-08-24 PROCEDURE — 99900026 HC AIRWAY MAINTENANCE (STAT)

## 2020-08-24 PROCEDURE — 99291 PR CRITICAL CARE, E/M 30-74 MINUTES: ICD-10-PCS | Mod: ,,, | Performed by: ANESTHESIOLOGY

## 2020-08-24 PROCEDURE — S5010 5% DEXTROSE AND 0.45% SALINE: HCPCS | Performed by: STUDENT IN AN ORGANIZED HEALTH CARE EDUCATION/TRAINING PROGRAM

## 2020-08-24 PROCEDURE — 94002 VENT MGMT INPAT INIT DAY: CPT

## 2020-08-24 PROCEDURE — 88307 TISSUE EXAM BY PATHOLOGIST: CPT | Mod: 26,,, | Performed by: PATHOLOGY

## 2020-08-24 PROCEDURE — 94761 N-INVAS EAR/PLS OXIMETRY MLT: CPT

## 2020-08-24 PROCEDURE — 82803 BLOOD GASES ANY COMBINATION: CPT

## 2020-08-24 PROCEDURE — 99291 CRITICAL CARE FIRST HOUR: CPT | Mod: ,,, | Performed by: ANESTHESIOLOGY

## 2020-08-24 PROCEDURE — 82247 BILIRUBIN TOTAL: CPT

## 2020-08-24 PROCEDURE — 37799 UNLISTED PX VASCULAR SURGERY: CPT

## 2020-08-24 PROCEDURE — P9021 RED BLOOD CELLS UNIT: HCPCS

## 2020-08-24 PROCEDURE — 49203 PR EXCISION/DESTRUCTION OPEN ABDOMINAL TUMORS 5 CM: ICD-10-PCS | Mod: 82,,, | Performed by: TRANSPLANT SURGERY

## 2020-08-24 PROCEDURE — 36415 COLL VENOUS BLD VENIPUNCTURE: CPT

## 2020-08-24 PROCEDURE — 89051 BODY FLUID CELL COUNT: CPT

## 2020-08-24 PROCEDURE — 86965 POOLING BLOOD PLATELETS: CPT

## 2020-08-24 PROCEDURE — 85730 THROMBOPLASTIN TIME PARTIAL: CPT | Mod: 91

## 2020-08-24 PROCEDURE — 88305 TISSUE EXAM BY PATHOLOGIST: CPT | Mod: 59 | Performed by: PATHOLOGY

## 2020-08-24 PROCEDURE — 36556 INSERT NON-TUNNEL CV CATH: CPT | Mod: 59,,, | Performed by: ANESTHESIOLOGY

## 2020-08-24 PROCEDURE — 88331 PR  PATH CONSULT IN SURG,W FRZ SEC: ICD-10-PCS | Mod: 26,,, | Performed by: PATHOLOGY

## 2020-08-24 PROCEDURE — 71000039 HC RECOVERY, EACH ADD'L HOUR: Performed by: TRANSPLANT SURGERY

## 2020-08-24 PROCEDURE — 88331 PATH CONSLTJ SURG 1 BLK 1SPC: CPT | Performed by: PATHOLOGY

## 2020-08-24 PROCEDURE — 27201423 OPTIME MED/SURG SUP & DEVICES STERILE SUPPLY: Performed by: TRANSPLANT SURGERY

## 2020-08-24 PROCEDURE — 88112 CYTOPATH CELL ENHANCE TECH: CPT | Performed by: PATHOLOGY

## 2020-08-24 PROCEDURE — 82962 GLUCOSE BLOOD TEST: CPT | Performed by: TRANSPLANT SURGERY

## 2020-08-24 PROCEDURE — 49203 PR EXCISION/DESTRUCTION OPEN ABDOMINAL TUMORS 5 CM: CPT | Mod: ,,, | Performed by: TRANSPLANT SURGERY

## 2020-08-24 PROCEDURE — 36000706: Performed by: TRANSPLANT SURGERY

## 2020-08-24 PROCEDURE — 36000707: Performed by: TRANSPLANT SURGERY

## 2020-08-24 PROCEDURE — 85384 FIBRINOGEN ACTIVITY: CPT | Mod: 91

## 2020-08-24 PROCEDURE — 71000033 HC RECOVERY, INTIAL HOUR: Performed by: TRANSPLANT SURGERY

## 2020-08-24 PROCEDURE — P9012 CRYOPRECIPITATE EACH UNIT: HCPCS

## 2020-08-24 PROCEDURE — 27000191 HC C-V MONITORING

## 2020-08-24 PROCEDURE — 88307 PR  SURG PATH,LEVEL V: ICD-10-PCS | Mod: 26,,, | Performed by: PATHOLOGY

## 2020-08-24 PROCEDURE — 88307 TISSUE EXAM BY PATHOLOGIST: CPT | Performed by: PATHOLOGY

## 2020-08-24 PROCEDURE — D9220A PRA ANESTHESIA: Mod: ,,, | Performed by: ANESTHESIOLOGY

## 2020-08-24 PROCEDURE — P9045 ALBUMIN (HUMAN), 5%, 250 ML: HCPCS | Mod: JG | Performed by: STUDENT IN AN ORGANIZED HEALTH CARE EDUCATION/TRAINING PROGRAM

## 2020-08-24 PROCEDURE — C1729 CATH, DRAINAGE: HCPCS | Performed by: TRANSPLANT SURGERY

## 2020-08-24 PROCEDURE — 37000009 HC ANESTHESIA EA ADD 15 MINS: Performed by: TRANSPLANT SURGERY

## 2020-08-24 PROCEDURE — 27201039 HC RAPID INFUSION SYSTEM (R.I.S.)

## 2020-08-24 PROCEDURE — D9220A PRA ANESTHESIA: ICD-10-PCS | Mod: ,,, | Performed by: ANESTHESIOLOGY

## 2020-08-24 PROCEDURE — 27100088 HC CELL SAVER

## 2020-08-24 PROCEDURE — 27000221 HC OXYGEN, UP TO 24 HOURS

## 2020-08-24 PROCEDURE — 88112 PR  CYTOPATH, CELL ENHANCE TECH: ICD-10-PCS | Mod: 26,,, | Performed by: PATHOLOGY

## 2020-08-24 PROCEDURE — 86850 RBC ANTIBODY SCREEN: CPT

## 2020-08-24 PROCEDURE — 20000000 HC ICU ROOM

## 2020-08-24 PROCEDURE — 88312 SPECIAL STAINS GROUP 1: CPT | Mod: 59 | Performed by: PATHOLOGY

## 2020-08-24 PROCEDURE — 49203 PR EXCISION/DESTRUCTION OPEN ABDOMINAL TUMORS 5 CM: ICD-10-PCS | Mod: ,,, | Performed by: TRANSPLANT SURGERY

## 2020-08-24 PROCEDURE — 88312 PR  SPECIAL STAINS,GROUP I: ICD-10-PCS | Mod: 26,,, | Performed by: PATHOLOGY

## 2020-08-24 PROCEDURE — 88341 PR IHC OR ICC EACH ADD'L SINGLE ANTIBODY  STAINPR: ICD-10-PCS | Mod: 26,,, | Performed by: PATHOLOGY

## 2020-08-24 PROCEDURE — 83735 ASSAY OF MAGNESIUM: CPT

## 2020-08-24 PROCEDURE — 27201041 HC RESERVOIR, CARDIOTOMY

## 2020-08-24 PROCEDURE — 88342 IMHCHEM/IMCYTCHM 1ST ANTB: CPT | Performed by: PATHOLOGY

## 2020-08-24 PROCEDURE — 36620 INSERTION CATHETER ARTERY: CPT | Mod: 59,,, | Performed by: ANESTHESIOLOGY

## 2020-08-24 PROCEDURE — 49203 PR EXCISION/DESTRUCTION OPEN ABDOMINAL TUMORS 5 CM: CPT | Mod: 82,,, | Performed by: TRANSPLANT SURGERY

## 2020-08-24 PROCEDURE — 85025 COMPLETE CBC W/AUTO DIFF WBC: CPT | Mod: 91

## 2020-08-24 PROCEDURE — 88331 PATH CONSLTJ SURG 1 BLK 1SPC: CPT | Mod: 26,,, | Performed by: PATHOLOGY

## 2020-08-24 PROCEDURE — 94003 VENT MGMT INPAT SUBQ DAY: CPT

## 2020-08-24 PROCEDURE — 85002 BLEEDING TIME TEST: CPT

## 2020-08-24 RX ORDER — LIDOCAINE HCL/PF 100 MG/5ML
SYRINGE (ML) INTRAVENOUS
Status: DISCONTINUED | OUTPATIENT
Start: 2020-08-24 | End: 2020-08-24

## 2020-08-24 RX ORDER — DEXAMETHASONE SODIUM PHOSPHATE 4 MG/ML
INJECTION, SOLUTION INTRA-ARTICULAR; INTRALESIONAL; INTRAMUSCULAR; INTRAVENOUS; SOFT TISSUE
Status: DISCONTINUED | OUTPATIENT
Start: 2020-08-24 | End: 2020-08-24

## 2020-08-24 RX ORDER — LIDOCAINE HYDROCHLORIDE 10 MG/ML
1 INJECTION, SOLUTION EPIDURAL; INFILTRATION; INTRACAUDAL; PERINEURAL ONCE
Status: DISCONTINUED | OUTPATIENT
Start: 2020-08-24 | End: 2020-08-24 | Stop reason: HOSPADM

## 2020-08-24 RX ORDER — PROPOFOL 10 MG/ML
5 INJECTION, EMULSION INTRAVENOUS CONTINUOUS
Status: DISCONTINUED | OUTPATIENT
Start: 2020-08-24 | End: 2020-08-25

## 2020-08-24 RX ORDER — FENTANYL CITRATE 50 UG/ML
INJECTION, SOLUTION INTRAMUSCULAR; INTRAVENOUS
Status: DISCONTINUED | OUTPATIENT
Start: 2020-08-24 | End: 2020-08-24

## 2020-08-24 RX ORDER — HYDROMORPHONE HYDROCHLORIDE 1 MG/ML
0.2 INJECTION, SOLUTION INTRAMUSCULAR; INTRAVENOUS; SUBCUTANEOUS EVERY 5 MIN PRN
Status: CANCELLED | OUTPATIENT
Start: 2020-08-24

## 2020-08-24 RX ORDER — HYDROCODONE BITARTRATE AND ACETAMINOPHEN 500; 5 MG/1; MG/1
TABLET ORAL
Status: DISCONTINUED | OUTPATIENT
Start: 2020-08-24 | End: 2020-08-26

## 2020-08-24 RX ORDER — INSULIN ASPART 100 [IU]/ML
0-5 INJECTION, SOLUTION INTRAVENOUS; SUBCUTANEOUS EVERY 6 HOURS PRN
Status: DISCONTINUED | OUTPATIENT
Start: 2020-08-24 | End: 2020-08-31 | Stop reason: HOSPADM

## 2020-08-24 RX ORDER — HYDROMORPHONE HYDROCHLORIDE 1 MG/ML
0.5 INJECTION, SOLUTION INTRAMUSCULAR; INTRAVENOUS; SUBCUTANEOUS EVERY 6 HOURS PRN
Status: DISCONTINUED | OUTPATIENT
Start: 2020-08-24 | End: 2020-08-25

## 2020-08-24 RX ORDER — CEFAZOLIN SODIUM 1 G/3ML
2 INJECTION, POWDER, FOR SOLUTION INTRAMUSCULAR; INTRAVENOUS
Status: DISCONTINUED | OUTPATIENT
Start: 2020-08-24 | End: 2020-08-28

## 2020-08-24 RX ORDER — PROPOFOL 10 MG/ML
VIAL (ML) INTRAVENOUS
Status: DISCONTINUED | OUTPATIENT
Start: 2020-08-24 | End: 2020-08-24

## 2020-08-24 RX ORDER — BUPIVACAINE HYDROCHLORIDE 2.5 MG/ML
INJECTION, SOLUTION EPIDURAL; INFILTRATION; INTRACAUDAL
Status: DISCONTINUED | OUTPATIENT
Start: 2020-08-24 | End: 2020-08-24 | Stop reason: HOSPADM

## 2020-08-24 RX ORDER — VASOPRESSIN 20 [USP'U]/ML
INJECTION, SOLUTION INTRAMUSCULAR; SUBCUTANEOUS
Status: DISCONTINUED | OUTPATIENT
Start: 2020-08-24 | End: 2020-08-24

## 2020-08-24 RX ORDER — ONDANSETRON 2 MG/ML
4 INJECTION INTRAMUSCULAR; INTRAVENOUS ONCE AS NEEDED
Status: CANCELLED | OUTPATIENT
Start: 2020-08-24 | End: 2032-01-20

## 2020-08-24 RX ORDER — SODIUM CHLORIDE 0.9 % (FLUSH) 0.9 %
2 SYRINGE (ML) INJECTION
Status: CANCELLED | OUTPATIENT
Start: 2020-08-24

## 2020-08-24 RX ORDER — ONDANSETRON 2 MG/ML
INJECTION INTRAMUSCULAR; INTRAVENOUS
Status: DISCONTINUED | OUTPATIENT
Start: 2020-08-24 | End: 2020-08-24

## 2020-08-24 RX ORDER — NOREPINEPHRINE BITARTRATE 1 MG/ML
INJECTION, SOLUTION INTRAVENOUS
Status: DISCONTINUED | OUTPATIENT
Start: 2020-08-24 | End: 2020-08-24

## 2020-08-24 RX ORDER — HYDROCODONE BITARTRATE AND ACETAMINOPHEN 500; 5 MG/1; MG/1
TABLET ORAL
Status: DISCONTINUED | OUTPATIENT
Start: 2020-08-24 | End: 2020-08-25

## 2020-08-24 RX ORDER — NOREPINEPHRINE BITARTRATE/D5W 4MG/250ML
0.02 PLASTIC BAG, INJECTION (ML) INTRAVENOUS CONTINUOUS
Status: DISCONTINUED | OUTPATIENT
Start: 2020-08-24 | End: 2020-08-25

## 2020-08-24 RX ORDER — PROPOFOL 10 MG/ML
INJECTION, EMULSION INTRAVENOUS
Status: COMPLETED
Start: 2020-08-24 | End: 2020-08-24

## 2020-08-24 RX ORDER — SODIUM CHLORIDE 9 MG/ML
INJECTION, SOLUTION INTRAVENOUS CONTINUOUS
Status: DISCONTINUED | OUTPATIENT
Start: 2020-08-24 | End: 2020-08-25

## 2020-08-24 RX ORDER — ROCURONIUM BROMIDE 10 MG/ML
INJECTION, SOLUTION INTRAVENOUS
Status: DISCONTINUED | OUTPATIENT
Start: 2020-08-24 | End: 2020-08-24

## 2020-08-24 RX ORDER — GLUCAGON 1 MG
1 KIT INJECTION
Status: DISCONTINUED | OUTPATIENT
Start: 2020-08-24 | End: 2020-08-31 | Stop reason: HOSPADM

## 2020-08-24 RX ORDER — MIDAZOLAM HYDROCHLORIDE 1 MG/ML
INJECTION, SOLUTION INTRAMUSCULAR; INTRAVENOUS
Status: DISCONTINUED | OUTPATIENT
Start: 2020-08-24 | End: 2020-08-24

## 2020-08-24 RX ORDER — SUCCINYLCHOLINE CHLORIDE 20 MG/ML
INJECTION INTRAMUSCULAR; INTRAVENOUS
Status: DISCONTINUED | OUTPATIENT
Start: 2020-08-24 | End: 2020-08-24

## 2020-08-24 RX ORDER — VECURONIUM BROMIDE FOR INJECTION 1 MG/ML
INJECTION, POWDER, LYOPHILIZED, FOR SOLUTION INTRAVENOUS
Status: DISCONTINUED | OUTPATIENT
Start: 2020-08-24 | End: 2020-08-24

## 2020-08-24 RX ORDER — ALBUMIN HUMAN 50 G/1000ML
SOLUTION INTRAVENOUS CONTINUOUS PRN
Status: DISCONTINUED | OUTPATIENT
Start: 2020-08-24 | End: 2020-08-24

## 2020-08-24 RX ORDER — DEXTROSE MONOHYDRATE AND SODIUM CHLORIDE 5; .45 G/100ML; G/100ML
INJECTION, SOLUTION INTRAVENOUS CONTINUOUS
Status: DISCONTINUED | OUTPATIENT
Start: 2020-08-24 | End: 2020-08-27

## 2020-08-24 RX ORDER — PHENYLEPHRINE HYDROCHLORIDE 10 MG/ML
INJECTION INTRAVENOUS
Status: DISCONTINUED | OUTPATIENT
Start: 2020-08-24 | End: 2020-08-24

## 2020-08-24 RX ORDER — PROPOFOL 10 MG/ML
VIAL (ML) INTRAVENOUS CONTINUOUS PRN
Status: DISCONTINUED | OUTPATIENT
Start: 2020-08-24 | End: 2020-08-24

## 2020-08-24 RX ADMIN — LIDOCAINE HYDROCHLORIDE 80 MG: 20 INJECTION, SOLUTION INTRAVENOUS at 07:08

## 2020-08-24 RX ADMIN — FENTANYL CITRATE 50 MCG: 50 INJECTION, SOLUTION INTRAMUSCULAR; INTRAVENOUS at 08:08

## 2020-08-24 RX ADMIN — SODIUM CHLORIDE, SODIUM GLUCONATE, SODIUM ACETATE, POTASSIUM CHLORIDE, MAGNESIUM CHLORIDE, SODIUM PHOSPHATE, DIBASIC, AND POTASSIUM PHOSPHATE: .53; .5; .37; .037; .03; .012; .00082 INJECTION, SOLUTION INTRAVENOUS at 07:08

## 2020-08-24 RX ADMIN — PROPOFOL 80 MG: 10 INJECTION, EMULSION INTRAVENOUS at 07:08

## 2020-08-24 RX ADMIN — SUGAMMADEX 200 MG: 100 INJECTION, SOLUTION INTRAVENOUS at 01:08

## 2020-08-24 RX ADMIN — SUCCINYLCHOLINE CHLORIDE 160 MG: 20 INJECTION, SOLUTION INTRAMUSCULAR; INTRAVENOUS at 07:08

## 2020-08-24 RX ADMIN — CALCIUM CHLORIDE 1 G: 100 INJECTION, SOLUTION INTRAVENOUS at 09:08

## 2020-08-24 RX ADMIN — CEFAZOLIN 2 G: 330 INJECTION, POWDER, FOR SOLUTION INTRAMUSCULAR; INTRAVENOUS at 08:08

## 2020-08-24 RX ADMIN — VASOPRESSIN 0.04 UNITS/MIN: 20 INJECTION INTRAVENOUS at 10:08

## 2020-08-24 RX ADMIN — ROCURONIUM BROMIDE 5 MG: 10 INJECTION, SOLUTION INTRAVENOUS at 07:08

## 2020-08-24 RX ADMIN — PROPOFOL 30 MCG/KG/MIN: 10 INJECTION, EMULSION INTRAVENOUS at 07:08

## 2020-08-24 RX ADMIN — CEFAZOLIN 2 G: 330 INJECTION, POWDER, FOR SOLUTION INTRAMUSCULAR; INTRAVENOUS at 12:08

## 2020-08-24 RX ADMIN — NOREPINEPHRINE BITARTRATE 16 MCG: 1 INJECTION, SOLUTION, CONCENTRATE INTRAVENOUS at 09:08

## 2020-08-24 RX ADMIN — ROCURONIUM BROMIDE 95 MG: 10 INJECTION, SOLUTION INTRAVENOUS at 07:08

## 2020-08-24 RX ADMIN — PROPOFOL 50 MCG/KG/MIN: 10 INJECTION, EMULSION INTRAVENOUS at 01:08

## 2020-08-24 RX ADMIN — PROPOFOL 50 MCG/KG/MIN: 10 INJECTION, EMULSION INTRAVENOUS at 03:08

## 2020-08-24 RX ADMIN — NOREPINEPHRINE BITARTRATE 0.04 MCG/KG/MIN: 1 INJECTION, SOLUTION, CONCENTRATE INTRAVENOUS at 09:08

## 2020-08-24 RX ADMIN — SODIUM CHLORIDE: 0.9 INJECTION, SOLUTION INTRAVENOUS at 07:08

## 2020-08-24 RX ADMIN — FENTANYL CITRATE 100 MCG: 50 INJECTION, SOLUTION INTRAMUSCULAR; INTRAVENOUS at 07:08

## 2020-08-24 RX ADMIN — SODIUM CHLORIDE, SODIUM LACTATE, POTASSIUM CHLORIDE, AND CALCIUM CHLORIDE 500 ML: .6; .31; .03; .02 INJECTION, SOLUTION INTRAVENOUS at 06:08

## 2020-08-24 RX ADMIN — CALCIUM CHLORIDE 1 G: 100 INJECTION, SOLUTION INTRAVENOUS at 10:08

## 2020-08-24 RX ADMIN — NOREPINEPHRINE BITARTRATE 32 MCG: 1 INJECTION, SOLUTION, CONCENTRATE INTRAVENOUS at 09:08

## 2020-08-24 RX ADMIN — DEXTROSE AND SODIUM CHLORIDE: 5; .45 INJECTION, SOLUTION INTRAVENOUS at 08:08

## 2020-08-24 RX ADMIN — DEXAMETHASONE SODIUM PHOSPHATE 8 MG: 4 INJECTION, SOLUTION INTRAMUSCULAR; INTRAVENOUS at 08:08

## 2020-08-24 RX ADMIN — FENTANYL CITRATE 50 MCG: 50 INJECTION, SOLUTION INTRAMUSCULAR; INTRAVENOUS at 01:08

## 2020-08-24 RX ADMIN — DEXTROSE AND SODIUM CHLORIDE: 5; .45 INJECTION, SOLUTION INTRAVENOUS at 01:08

## 2020-08-24 RX ADMIN — VASOPRESSIN 0.04 UNITS/MIN: 20 INJECTION INTRAVENOUS at 03:08

## 2020-08-24 RX ADMIN — FENTANYL CITRATE 50 MCG: 50 INJECTION, SOLUTION INTRAMUSCULAR; INTRAVENOUS at 09:08

## 2020-08-24 RX ADMIN — HYDROMORPHONE HYDROCHLORIDE 0.5 MG: 1 INJECTION, SOLUTION INTRAMUSCULAR; INTRAVENOUS; SUBCUTANEOUS at 05:08

## 2020-08-24 RX ADMIN — PROPOFOL 50 MCG/KG/MIN: 10 INJECTION, EMULSION INTRAVENOUS at 12:08

## 2020-08-24 RX ADMIN — VASOPRESSIN 2 UNITS: 20 INJECTION INTRAVENOUS at 10:08

## 2020-08-24 RX ADMIN — MIDAZOLAM HYDROCHLORIDE 2 MG: 1 INJECTION, SOLUTION INTRAMUSCULAR; INTRAVENOUS at 09:08

## 2020-08-24 RX ADMIN — NOREPINEPHRINE BITARTRATE 16 MCG: 1 INJECTION, SOLUTION, CONCENTRATE INTRAVENOUS at 10:08

## 2020-08-24 RX ADMIN — PHENYLEPHRINE HYDROCHLORIDE 100 MCG: 10 INJECTION INTRAVENOUS at 08:08

## 2020-08-24 RX ADMIN — FENTANYL CITRATE 50 MCG: 50 INJECTION, SOLUTION INTRAMUSCULAR; INTRAVENOUS at 12:08

## 2020-08-24 RX ADMIN — ROCURONIUM BROMIDE 50 MG: 10 INJECTION, SOLUTION INTRAVENOUS at 09:08

## 2020-08-24 RX ADMIN — VASOPRESSIN 3 UNITS: 20 INJECTION INTRAVENOUS at 10:08

## 2020-08-24 RX ADMIN — SODIUM CHLORIDE 3 UNITS/HR: 9 INJECTION, SOLUTION INTRAVENOUS at 10:08

## 2020-08-24 RX ADMIN — ALBUMIN (HUMAN): 12.5 SOLUTION INTRAVENOUS at 08:08

## 2020-08-24 RX ADMIN — Medication 0.08 MCG/KG/MIN: at 01:08

## 2020-08-24 RX ADMIN — VASOPRESSIN 0.04 UNITS/MIN: 20 INJECTION INTRAVENOUS at 01:08

## 2020-08-24 RX ADMIN — VECURONIUM BROMIDE 10 MG: 10 INJECTION, POWDER, LYOPHILIZED, FOR SOLUTION INTRAVENOUS at 10:08

## 2020-08-24 NOTE — ANESTHESIA PROCEDURE NOTES
Central Line    Diagnosis: Liver mass  Doctor requesting consult: Seal  Patient location during procedure: done in OR  Procedure start time: 8/24/2020 7:54 AM  Timeout: 8/24/2020 7:46 AM  Procedure end time: 8/24/2020 7:54 AM    Staffing  Authorizing Provider: Fredrick Cespedes MD  Performing Provider: Fredrick Cespedes MD    Staffing  Anesthesiologist: Fredrick Cespedes MD  Performed: anesthesiologist   Anesthesiologist was present at the time of the procedure.  Preanesthetic Checklist  Completed: patient identified, site marked, surgical consent, pre-op evaluation, timeout performed, IV checked, risks and benefits discussed, monitors and equipment checked and anesthesia consent given  Indication   Indication: hemodynamic monitoring, vascular access     Anesthesia   general anesthesia    Central Line   Skin Prep: skin prepped with ChloraPrep, skin prep agent completely dried prior to procedure  maximum sterile barriers used during central venous catheter insertion  hand hygiene performed prior to central venous catheter insertion  Location: right, internal jugular.   Catheter type: triple lumen  Catheter Size: 7 Fr  Inserted Catheter Length: 14 cm  Ultrasound: none  Manometry: none  Insertion Attempts: 1   Securement:sterile dressing applied and chlorhexidine patch    Post-Procedure   Adverse Events:none    Guidewire

## 2020-08-24 NOTE — CARE UPDATE
Pt transported from PACU to SICU.  Pt bagged during transport.  t placed on vent at documented settings.  Will continue to monitor.

## 2020-08-24 NOTE — H&P
[facility]  Critical Care - Surgery  History & Physical    Patient Name: Magaly Hutchinson  MRN: [id number]  Admission Date: 8/24/2020  Code Status: No Order  Attending Physician: Pa Pressley MD   Primary Care Provider: Primary Doctor No   Principal Problem: <principal problem not specified>    Subjective:     HPI: 84 F s/p resection of large liver mass. She has noted abdominal distension over past several months/year which she attributed to weight gain. A few months ago she noted a pre-syncopal episode, abdominal pain. She was ultimately diagnosed with very large right lobe liver mass extending into the right retroperitoneum with displacement of right kidney and colon and compression of vena cava. She has also noted increased swelling in her legs, right greater than left.    Hospital/ICU Course: Admitted to SICU post operatively d/t large volume blood loss intraoperatively requiring 11u pRBCs, 11uFFP, 2u cryo, and 2 platelets. Remained intubated post op. Making good urine throughout case, however was hypotensive during beginning of case with concern for PALMIRA.     Follow-up For: Procedure(s) (LRB):  EXCISION, MASS, RETROPERITONEUM (Right)    Post-Operative Day: Day of Surgery  Past Medical History:   Diagnosis Date    Anemia     Arthritis     Diabetes mellitus     Diabetes mellitus, type 2     Hyperlipidemia     Hypertension     Neuromuscular disorder      Past Surgical History:   Procedure Laterality Date    BLADDER REPAIR      lift     CHOLECYSTECTOMY      2006/2007 ?    HYSTERECTOMY      1972    JOINT REPLACEMENT      2003    KNEE SURGERY       Review of patient's allergies indicates:  No Known Allergies    Family History     Problem Relation (Age of Onset)    No Known Problems Mother, Father        Tobacco Use    Smoking status: Never Smoker    Smokeless tobacco: Never Used   Substance and Sexual Activity    Alcohol use: No    Drug use: Never    Sexual activity: Not on file       Review of Systems   HENT: Positive for hearing loss.      Objective:     Vital Signs (Most Recent):  Temp: 98.4 °F (36.9 °C) (08/24/20 0638)  Pulse: 80 (08/24/20 0638)  Resp: 20 (08/24/20 0638)  BP: 125/69 (08/24/20 0638)  SpO2: 95 % (08/24/20 0638) Vital Signs (24h Range):  Temp:  [98.4 °F (36.9 °C)] 98.4 °F (36.9 °C)  Pulse:  [80] 80  Resp:  [20] 20  SpO2:  [95 %] 95 %  BP: (125)/(69) 125/69     Weight: 107 kg (236 lb)  Body mass index is 38.09 kg/m².    Intake/Output Summary (Last 24 hours) at 8/24/2020 1257  Last data filed at 8/24/2020 1237  Gross per 24 hour   Intake 25562 ml   Output 49064 ml   Net -105 ml     Physical Exam  Constitutional:       Interventions: She is sedated and intubated.   Pulmonary:      Effort: She is intubated.   Abdominal:      Comments: NATALEE in RUQ      Vents:   Lines/Drains/Airways     Central Venous Catheter Line             Introducer with Double Lumen 08/24/20 0747 less than 1 day    Introducer 08/24/20 0747 less than 1 day    Percutaneous Central Line Insertion/Assessment - Triple Lumen  08/24/20 0754 less than 1 day          Drain                 Closed/Suction Drain 08/24/20 1210 Right RLQ Bulb 19 Fr. less than 1 day         Urethral Catheter 08/24/20 0725 Non-latex;Straight-tip 16 Fr. less than 1 day          Airway                 Airway - Non-Surgical 08/24/20 0720 less than 1 day          Arterial Line                 Arterial Line 08/24/20 0720 Left Radial less than 1 day          Peripheral Intravenous Line                 Peripheral IV - Single Lumen 08/24/20 0700 22 G Left Hand less than 1 day         Peripheral IV - Single Lumen 08/24/20 0738 18 G Right Wrist less than 1 day              Significant Labs:  CBC/Anemia Profile:  Recent Labs   Lab 08/24/20  1023 08/24/20  1153   WBC 13.36* 6.60   HGB 4.8* 10.1*   HCT 15.6* 31.3*   PLT 78* 145*   MCV 92 95   RDW 17.8* 15.9*      Chemistries:  Recent Labs   Lab 08/24/20  1023      K 4.0      CO2 18*   BUN 15    CREATININE 0.6   CALCIUM 8.2*   MG 2.1      Significant Imaging: I have reviewed and interpreted all pertinent imaging results/findings within the past 24 hours.    Assessment/Plan:     84 year old female patient who is s/p resection of large liver mass on 8/24/2020 during which she had significant blood loss requiring 11 u RBCs, 11u FFP, 2 cryo, and 2 of platelets.     Neuro:  - Sedated on ventilator upon arrival   - Pain control    CV:  - Levo at 0.01 and Vaso   - Will wean gtts as tolerated    Pulm:  - On ventilator upon arrival  - Q2hr ABGs  - Daily SBTs until extubated  - Daily CXR    Renal:  - Q4 hour BMP; trend Cr  - Strict I/Os    FENGI:  - NPO  - q4hr BMP; replace lytes as indicated  - mIVF   - NATALEE drain to bulb suction; monitor output    Heme/ID:  - required 11u RBCs intraoperatively  - Q4 hr CBC to trend H/H  - transfuse as indicated    Dispo: SICU care      Oralia Mcmahon MD  [employer] General Surgery - PGY 2   08/24/2020 1:06 PM

## 2020-08-24 NOTE — ANESTHESIA PROCEDURE NOTES
Intubation  Performed by: Fredrick Cespedes MD  Authorized by: Fredrick Cespedes MD     Intubation:     Induction:  Rapid sequence induction    Intubated:  Postinduction    Mask Ventilation:  N/a    Attempts:  1    Attempted By:  Resident anesthesiologist    Method of Intubation:  Direct    Blade:  Avelar 2    Laryngeal View Grade: Grade I - full view of chords      Difficult Airway Encountered?: No      Complications:  None    Airway Device:  Oral endotracheal tube    Airway Device Size:  7.5    Style/Cuff Inflation:  Cuffed    Inflation Amount (mL):  4    Tube secured:  21    Secured at:  The lips    Placement Verified By:  Capnometry    Complicating Factors:  None    Findings Post-Intubation:  BS equal bilateral and atraumatic/condition of teeth unchanged

## 2020-08-24 NOTE — PLAN OF CARE
"      SICU PLAN OF CARE NOTE    Dx: S/P Liver Excision, acute blood loss    Shift Events: Surgery, Transfer from PACU    Goals of Care: Comfort, Wean pressors    Neuro: Sedated, follows commands    Vital Signs: BP (!) 110/57   Pulse 64   Temp 99.1 °F (37.3 °C) (Oral)   Resp (!) 21   Ht 5' 6" (1.676 m)   Wt 107 kg (236 lb)   SpO2 100%   Breastfeeding No   BMI 38.09 kg/m²     Respiratory: A/C 60%, 5 Peep    Diet: NPO, OGT to Liws    Gtts: Levo .03mcg/kg/min    Urine Output: 60cc/h    Drains:Right LQ NATALEE     Labs/Accuchecks: CBC q4h, Accuchecks q6h.    Skin: CDI, Insicion CDI       "

## 2020-08-24 NOTE — NURSING TRANSFER
Nursing Transfer Note      8/24/2020     Transfer To: [id number]    Transfer via bed    Transfer with cardiac monitoring, sunny, hernandez, ventilated    Transported by pct, rn rt    Medicines sent: on sunny meds    Chart send with patient: No    Notified: family

## 2020-08-24 NOTE — ANESTHESIA PROCEDURE NOTES
Arterial    Diagnosis: Liver Mass  Doctor requesting consult: Seal    Patient location during procedure: done in OR  Procedure start time: 8/24/2020 7:20 AM  Procedure end time: 8/24/2020 7:26 AM    Staffing  Authorizing Provider: Fredrick Cespedes MD  Performing Provider: Fredrick Cespedes MD    Anesthesiologist was present at the time of the procedure.    Preanesthetic Checklist  Completed: patient identified, site marked, surgical consent, pre-op evaluation, IV checked, risks and benefits discussed, monitors and equipment checked and anesthesia consent givenArterial  Skin Prep: chlorhexidine gluconate and isopropyl alcohol  Local Infiltration: none  Orientation: left  Location: radial  Catheter Size: 20 G  Catheter placement by Anatomical landmarks and Ultrasound guidance. Heme positive aspiration all ports.  Vessel Caliber: small, patent, compressibility poor  Vascular Doppler:  not done  Needle advanced into vessel with real time Ultrasound guidance.Insertion Attempts: 1  Assessment  Dressing: secured with tape and tegaderm  Patient: Tolerated well

## 2020-08-24 NOTE — OP NOTE
[facility]    Operative Report     Date of Procedure: 8/24/20    Surgeons:    Surgeon(s):  MD Anders Chen MD John B. Seal, MD     First Assistant Attestation:    The presence of an additional attending surgeon functioning as first assistant was required due to the complexity of the procedure relative to any available residents. I certify that no resident was available who was qualified to serve as first assistant. Duties performed by the assistant included assisting the primary surgeon.     Pre-operative Diagnosis: Retroperitoneal mass      Post-operative Diagnosis: Same     Procedure Perfomed: Excision retroperitoneal mass      Anesthesia: General endotracheal    Findings: Massive retroperoneal mass with dense adhesion to liver, bowel, kidney, retroperitoneum and vena cava, large vein draining mass into vena cava primary source of bleeding during case, highly vascularized cyst wall, ~8L dark brown fluid evacuated from cystic portion of mass, debris sent for fozen - necrotic, non-viable tissue.    Estimated Blood Loss: 2000 mL    Specimens: Cyst debris, cystic tumor (retroperitoneal mass), cyst fluid for analysis    Blood Products: 11 PRBC, 11 FFP, 2 PLT, 2 CRYO    Drains: 19 Cesar     Preamble  Indications and Patient Counseling: Patient is a 84 y.o. female with massive retroperitoneal mass presenting for surgical resection. The procedure was thoroughly discussed with the patient, including potential risks, complications, and alternatives. Specific complications mentioned included death, bleeding, infection, bile leak, intra-abdominal abscess pulmonary embolism, deep vein thrombosis, liver dysfunction/failure, as well as the possibility of other complications not specifically mentioned.     All questions were answered, the patient voiced appropriate understanding, and agreed to proceed with the planned procedure.     Time-Out: A complete time out  was carried out prior to incision, with confirmation of patient identity, correct procedure, correct operative site, appropriate antibiotic prophylaxis, review of any known allergies, and presence of all needed equipment.     Procedure in Detail  Following the induction of general endotracheal anesthesia, appropriate arterial and venous lines were placed by the anesthesia team.  Care was taken to pad all pressure points and avoid any potential traction injuries from positioning. The urinary bladder was catheterized.  Sequential compression boots and Uvaldo Huggers were used. The chest, abdomen, and upper thighs were sterilely prepped and draped.      The abdomen was entered via upper midline incision extending from the xiphoid to the umbilicus. Due to the distortion of her abdominal wall, the incision was over 50cm allowing for adequate exposure. Upon entering the abdomen, the massive cystic tumor was evident in the right lacho-abdomen with displacement of the bowel and mesentery medially to the left hemiabdomen. The peritoneum extended over the abdomen and was densly adherent in portions. A Chance retractor was placed to optimize exposure. We began with mobilization of the mass from surrounding strutures,  from the right colon and along the lateral margin of the peritoneum. Exposure for further dissection became limited, so the cyst fluid was evacuated (8L dark brown fluid). The cyst wall was opened further to allow for mechanical removal of debris within the cyst. The material was sent for frozen section, returned necrotic tissue. After evacuation of cyst contents, the cyst began filling with blood. Anesthesia team began resuscitation and blood transfusions. There were periods of hypotension to SBP 50 during this portion of the operation. The cyst was packed and the wall further opened to improve exposure and identify sources of bleeding. Ultimately, we identified a large venous structure (~3cm) draining  the cyst. The vessel was temporarily clamped while her anatomy was more clearly defines. We were able to identify and dissect out the vena cava as well as the carlos hepatis. Having identified these critical structures, the vessel was ligated and transected with a single load vascular stapler. We continued dissection circumferentially around the cyst, with remaining adhesion being quite dense and great care taken to avoid critical structures and her anatomy was grossly distorted by the mass effect. We identified the right kidney in the medial aspect of the right lower quadrant with vascular supply intact. It also became evident during dissection that the cyst was separate and did not arise from the liver. At this point we suspect the cyst possible arose from the adrenal gland based on location of the dominant vascular supply. The cystic mass was removed and sent for pathology. Hemostasis was obtained with argon beam coagulation and Ligasure along the resection bed. The cavity was packed for 30 minute break to allow for anesthesia to correct coagulopathy. A final inspection for hemostasis was done. A 19 Cesar drain was placed along the resection bed. The bowel was run and placed in the normal anatomic position. The peritoneum was injected with 0.25% Marcaine and fascia closed with #1 PDS suture. The skin was closed with staples. At the conclusion of the case all instrument, sponge and needle counts were correct. She was transported to the PACU intubated with plans to transfer to the ICU when bed is available.    Pa Pressley MD  Liver Transplant and Hepatobiliary Surgery

## 2020-08-24 NOTE — ANESTHESIA PROCEDURE NOTES
Central Line    Diagnosis: Liver mass  Doctor requesting consult: Seal  Patient location during procedure: done in OR  Procedure start time: 8/24/2020 7:47 AM  Timeout: 8/24/2020 7:46 AM  Procedure end time: 8/24/2020 7:54 AM    Staffing  Authorizing Provider: Fredrick Cespedes MD  Performing Provider: Fredrick Cespedes MD    Staffing  Anesthesiologist: Fredrick Cespedes MD  Performed: anesthesiologist   Anesthesiologist was present at the time of the procedure.  Preanesthetic Checklist  Completed: patient identified, site marked, surgical consent, pre-op evaluation, timeout performed, IV checked, risks and benefits discussed, monitors and equipment checked and anesthesia consent given  Indication   Indication: hemodynamic monitoring, vascular access, med administration     Anesthesia   general anesthesia    Central Line   Skin Prep: skin prepped with ChloraPrep, skin prep agent completely dried prior to procedure  maximum sterile barriers used during central venous catheter insertion  hand hygiene performed prior to central venous catheter insertion  Location: right, internal jugular.   Catheter type: introducer  Catheter Size: 14 Fr  Inserted Catheter Length: 10 cm  Ultrasound: vascular probe with ultrasound  Vessel Caliber: large, patent  Vascular Doppler:  not done, compressibility normal  Needle advanced into vessel with real time Ultrasound guidance.  Manometry: none  Insertion Attempts: 1   Securement:line sutured, chlorhexidine patch, sterile dressing applied and blood return through all ports    Post-Procedure   Adverse Events:none    Guidewire Guidewire removed intact.

## 2020-08-24 NOTE — INTERVAL H&P NOTE
The patient has been examined and the H&P has been reviewed:    I concur with the findings and no changes have occurred since H&P was written.     No fevers, chills, SOB, chest pain. No visits to the ED. No surgeries. No allergies.      Surgery risks, benefits and alternative options discussed and understood by patient/family.          Active Hospital Problems    Diagnosis  POA    Liver mass, right lobe [R16.0]  Yes      Resolved Hospital Problems   No resolved problems to display.

## 2020-08-25 LAB
ALBUMIN SERPL BCP-MCNC: 2.2 G/DL (ref 3.5–5.2)
ALBUMIN SERPL BCP-MCNC: 2.4 G/DL (ref 3.5–5.2)
ALBUMIN SERPL BCP-MCNC: 2.4 G/DL (ref 3.5–5.2)
ALLENS TEST: ABNORMAL
ALP SERPL-CCNC: 42 U/L (ref 55–135)
ALP SERPL-CCNC: 43 U/L (ref 55–135)
ALP SERPL-CCNC: 44 U/L (ref 55–135)
ALT SERPL W/O P-5'-P-CCNC: 21 U/L (ref 10–44)
ALT SERPL W/O P-5'-P-CCNC: 28 U/L (ref 10–44)
ALT SERPL W/O P-5'-P-CCNC: 28 U/L (ref 10–44)
ANION GAP SERPL CALC-SCNC: 5 MMOL/L (ref 8–16)
ANION GAP SERPL CALC-SCNC: 6 MMOL/L (ref 8–16)
ANION GAP SERPL CALC-SCNC: 6 MMOL/L (ref 8–16)
AST SERPL-CCNC: 100 U/L (ref 10–40)
AST SERPL-CCNC: 59 U/L (ref 10–40)
AST SERPL-CCNC: 85 U/L (ref 10–40)
BASOPHILS # BLD AUTO: 0.01 K/UL (ref 0–0.2)
BASOPHILS # BLD AUTO: 0.02 K/UL (ref 0–0.2)
BASOPHILS # BLD AUTO: 0.02 K/UL (ref 0–0.2)
BASOPHILS NFR BLD: 0.1 % (ref 0–1.9)
BASOPHILS NFR BLD: 0.1 % (ref 0–1.9)
BASOPHILS NFR BLD: 0.2 % (ref 0–1.9)
BILIRUB SERPL-MCNC: 1.4 MG/DL (ref 0.1–1)
BILIRUB SERPL-MCNC: 2.4 MG/DL (ref 0.1–1)
BILIRUB SERPL-MCNC: 2.8 MG/DL (ref 0.1–1)
BLD PROD TYP BPU: NORMAL
BLOOD UNIT EXPIRATION DATE: NORMAL
BLOOD UNIT TYPE CODE: 6200
BLOOD UNIT TYPE: NORMAL
BUN SERPL-MCNC: 20 MG/DL (ref 8–23)
CALCIUM SERPL-MCNC: 8.2 MG/DL (ref 8.7–10.5)
CALCIUM SERPL-MCNC: 8.3 MG/DL (ref 8.7–10.5)
CALCIUM SERPL-MCNC: 8.5 MG/DL (ref 8.7–10.5)
CHLORIDE SERPL-SCNC: 109 MMOL/L (ref 95–110)
CHLORIDE SERPL-SCNC: 112 MMOL/L (ref 95–110)
CHLORIDE SERPL-SCNC: 112 MMOL/L (ref 95–110)
CO2 SERPL-SCNC: 24 MMOL/L (ref 23–29)
CO2 SERPL-SCNC: 25 MMOL/L (ref 23–29)
CO2 SERPL-SCNC: 25 MMOL/L (ref 23–29)
CODING SYSTEM: NORMAL
CREAT SERPL-MCNC: 0.7 MG/DL (ref 0.5–1.4)
DIFFERENTIAL METHOD: ABNORMAL
DISPENSE STATUS: NORMAL
EOSINOPHIL # BLD AUTO: 0 K/UL (ref 0–0.5)
EOSINOPHIL NFR BLD: 0 % (ref 0–8)
ERYTHROCYTE [DISTWIDTH] IN BLOOD BY AUTOMATED COUNT: 16.3 % (ref 11.5–14.5)
ERYTHROCYTE [DISTWIDTH] IN BLOOD BY AUTOMATED COUNT: 16.3 % (ref 11.5–14.5)
ERYTHROCYTE [DISTWIDTH] IN BLOOD BY AUTOMATED COUNT: 16.5 % (ref 11.5–14.5)
EST. GFR  (AFRICAN AMERICAN): >60 ML/MIN/1.73 M^2
EST. GFR  (NON AFRICAN AMERICAN): >60 ML/MIN/1.73 M^2
FINAL PATHOLOGIC DIAGNOSIS: NORMAL
GLUCOSE SERPL-MCNC: 123 MG/DL (ref 70–110)
GLUCOSE SERPL-MCNC: 174 MG/DL (ref 70–110)
GLUCOSE SERPL-MCNC: 174 MG/DL (ref 70–110)
HCO3 UR-SCNC: 24.7 MMOL/L (ref 24–28)
HCT VFR BLD AUTO: 26.3 % (ref 37–48.5)
HCT VFR BLD AUTO: 27.5 % (ref 37–48.5)
HCT VFR BLD AUTO: 28.3 % (ref 37–48.5)
HGB BLD-MCNC: 8.7 G/DL (ref 12–16)
HGB BLD-MCNC: 9 G/DL (ref 12–16)
HGB BLD-MCNC: 9.5 G/DL (ref 12–16)
IMM GRANULOCYTES # BLD AUTO: 0.07 K/UL (ref 0–0.04)
IMM GRANULOCYTES NFR BLD AUTO: 0.5 % (ref 0–0.5)
IMM GRANULOCYTES NFR BLD AUTO: 0.5 % (ref 0–0.5)
IMM GRANULOCYTES NFR BLD AUTO: 0.6 % (ref 0–0.5)
LYMPHOCYTES # BLD AUTO: 0.7 K/UL (ref 1–4.8)
LYMPHOCYTES # BLD AUTO: 1 K/UL (ref 1–4.8)
LYMPHOCYTES # BLD AUTO: 1.2 K/UL (ref 1–4.8)
LYMPHOCYTES NFR BLD: 11.1 % (ref 18–48)
LYMPHOCYTES NFR BLD: 4.7 % (ref 18–48)
LYMPHOCYTES NFR BLD: 6.8 % (ref 18–48)
MCH RBC QN AUTO: 29.4 PG (ref 27–31)
MCH RBC QN AUTO: 29.6 PG (ref 27–31)
MCH RBC QN AUTO: 29.9 PG (ref 27–31)
MCHC RBC AUTO-ENTMCNC: 32.7 G/DL (ref 32–36)
MCHC RBC AUTO-ENTMCNC: 33.1 G/DL (ref 32–36)
MCHC RBC AUTO-ENTMCNC: 33.6 G/DL (ref 32–36)
MCV RBC AUTO: 89 FL (ref 82–98)
MCV RBC AUTO: 90 FL (ref 82–98)
MCV RBC AUTO: 90 FL (ref 82–98)
MONOCYTES # BLD AUTO: 0.7 K/UL (ref 0.3–1)
MONOCYTES # BLD AUTO: 0.8 K/UL (ref 0.3–1)
MONOCYTES # BLD AUTO: 0.8 K/UL (ref 0.3–1)
MONOCYTES NFR BLD: 5.5 % (ref 4–15)
MONOCYTES NFR BLD: 5.6 % (ref 4–15)
MONOCYTES NFR BLD: 6 % (ref 4–15)
NEUTROPHILS # BLD AUTO: 12.1 K/UL (ref 1.8–7.7)
NEUTROPHILS # BLD AUTO: 12.6 K/UL (ref 1.8–7.7)
NEUTROPHILS # BLD AUTO: 9.2 K/UL (ref 1.8–7.7)
NEUTROPHILS NFR BLD: 82.1 % (ref 38–73)
NEUTROPHILS NFR BLD: 87.1 % (ref 38–73)
NEUTROPHILS NFR BLD: 89.1 % (ref 38–73)
NRBC BLD-RTO: 0 /100 WBC
PCO2 BLDA: 42.5 MMHG (ref 35–45)
PH SMN: 7.37 [PH] (ref 7.35–7.45)
PLATELET # BLD AUTO: 126 K/UL (ref 150–350)
PLATELET # BLD AUTO: 130 K/UL (ref 150–350)
PLATELET # BLD AUTO: 98 K/UL (ref 150–350)
PLATELET BLD QL SMEAR: ABNORMAL
PMV BLD AUTO: 10.3 FL (ref 9.2–12.9)
PMV BLD AUTO: 10.5 FL (ref 9.2–12.9)
PMV BLD AUTO: 10.8 FL (ref 9.2–12.9)
PO2 BLDA: 139 MMHG (ref 80–100)
POC BE: -1 MMOL/L
POC SATURATED O2: 99 % (ref 95–100)
POC TCO2: 26 MMOL/L (ref 23–27)
POCT GLUCOSE: 106 MG/DL (ref 70–110)
POCT GLUCOSE: 153 MG/DL (ref 70–110)
POCT GLUCOSE: 97 MG/DL (ref 70–110)
POTASSIUM SERPL-SCNC: 3.7 MMOL/L (ref 3.5–5.1)
POTASSIUM SERPL-SCNC: 4.2 MMOL/L (ref 3.5–5.1)
POTASSIUM SERPL-SCNC: 4.3 MMOL/L (ref 3.5–5.1)
PROT SERPL-MCNC: 4.5 G/DL (ref 6–8.4)
PROT SERPL-MCNC: 4.7 G/DL (ref 6–8.4)
PROT SERPL-MCNC: 4.8 G/DL (ref 6–8.4)
RBC # BLD AUTO: 2.94 M/UL (ref 4–5.4)
RBC # BLD AUTO: 3.06 M/UL (ref 4–5.4)
RBC # BLD AUTO: 3.18 M/UL (ref 4–5.4)
SAMPLE: ABNORMAL
SITE: ABNORMAL
SODIUM SERPL-SCNC: 139 MMOL/L (ref 136–145)
SODIUM SERPL-SCNC: 142 MMOL/L (ref 136–145)
SODIUM SERPL-SCNC: 143 MMOL/L (ref 136–145)
TRANS ERYTHROCYTES VOL PATIENT: NORMAL ML
WBC # BLD AUTO: 11.21 K/UL (ref 3.9–12.7)
WBC # BLD AUTO: 13.91 K/UL (ref 3.9–12.7)
WBC # BLD AUTO: 14.18 K/UL (ref 3.9–12.7)

## 2020-08-25 PROCEDURE — 84100 ASSAY OF PHOSPHORUS: CPT

## 2020-08-25 PROCEDURE — 99291 PR CRITICAL CARE, E/M 30-74 MINUTES: ICD-10-PCS | Mod: ,,, | Performed by: ANESTHESIOLOGY

## 2020-08-25 PROCEDURE — 63600175 PHARM REV CODE 636 W HCPCS: Performed by: STUDENT IN AN ORGANIZED HEALTH CARE EDUCATION/TRAINING PROGRAM

## 2020-08-25 PROCEDURE — 94761 N-INVAS EAR/PLS OXIMETRY MLT: CPT

## 2020-08-25 PROCEDURE — 99291 CRITICAL CARE FIRST HOUR: CPT | Mod: ,,, | Performed by: ANESTHESIOLOGY

## 2020-08-25 PROCEDURE — 36415 COLL VENOUS BLD VENIPUNCTURE: CPT

## 2020-08-25 PROCEDURE — 94003 VENT MGMT INPAT SUBQ DAY: CPT

## 2020-08-25 PROCEDURE — 25000003 PHARM REV CODE 250: Performed by: STUDENT IN AN ORGANIZED HEALTH CARE EDUCATION/TRAINING PROGRAM

## 2020-08-25 PROCEDURE — 94150 VITAL CAPACITY TEST: CPT

## 2020-08-25 PROCEDURE — S5010 5% DEXTROSE AND 0.45% SALINE: HCPCS | Performed by: STUDENT IN AN ORGANIZED HEALTH CARE EDUCATION/TRAINING PROGRAM

## 2020-08-25 PROCEDURE — 37799 UNLISTED PX VASCULAR SURGERY: CPT

## 2020-08-25 PROCEDURE — 27000221 HC OXYGEN, UP TO 24 HOURS

## 2020-08-25 PROCEDURE — 85025 COMPLETE CBC W/AUTO DIFF WBC: CPT | Mod: 91

## 2020-08-25 PROCEDURE — 99900035 HC TECH TIME PER 15 MIN (STAT)

## 2020-08-25 PROCEDURE — 94010 BREATHING CAPACITY TEST: CPT

## 2020-08-25 PROCEDURE — 80053 COMPREHEN METABOLIC PANEL: CPT

## 2020-08-25 PROCEDURE — 20600001 HC STEP DOWN PRIVATE ROOM

## 2020-08-25 PROCEDURE — 82803 BLOOD GASES ANY COMBINATION: CPT

## 2020-08-25 PROCEDURE — 25000003 PHARM REV CODE 250: Performed by: TRANSPLANT SURGERY

## 2020-08-25 PROCEDURE — 80053 COMPREHEN METABOLIC PANEL: CPT | Mod: 91

## 2020-08-25 PROCEDURE — 99900026 HC AIRWAY MAINTENANCE (STAT)

## 2020-08-25 RX ORDER — DOCUSATE SODIUM 100 MG/1
100 CAPSULE, LIQUID FILLED ORAL 3 TIMES DAILY
Status: DISCONTINUED | OUTPATIENT
Start: 2020-08-26 | End: 2020-08-31 | Stop reason: HOSPADM

## 2020-08-25 RX ORDER — OXYCODONE HYDROCHLORIDE 10 MG/1
10 TABLET ORAL EVERY 6 HOURS PRN
Status: DISCONTINUED | OUTPATIENT
Start: 2020-08-25 | End: 2020-08-31 | Stop reason: HOSPADM

## 2020-08-25 RX ORDER — OXYCODONE HYDROCHLORIDE 5 MG/1
5 TABLET ORAL EVERY 6 HOURS PRN
Status: DISCONTINUED | OUTPATIENT
Start: 2020-08-25 | End: 2020-08-31 | Stop reason: HOSPADM

## 2020-08-25 RX ORDER — POTASSIUM CHLORIDE 14.9 MG/ML
20 INJECTION INTRAVENOUS ONCE
Status: COMPLETED | OUTPATIENT
Start: 2020-08-25 | End: 2020-08-25

## 2020-08-25 RX ORDER — IBUPROFEN 400 MG/1
400 TABLET ORAL 3 TIMES DAILY
Status: DISCONTINUED | OUTPATIENT
Start: 2020-08-25 | End: 2020-08-26

## 2020-08-25 RX ORDER — BISACODYL 5 MG
10 TABLET, DELAYED RELEASE (ENTERIC COATED) ORAL NIGHTLY
Status: DISCONTINUED | OUTPATIENT
Start: 2020-08-25 | End: 2020-08-31 | Stop reason: HOSPADM

## 2020-08-25 RX ORDER — HEPARIN SODIUM 5000 [USP'U]/ML
5000 INJECTION, SOLUTION INTRAVENOUS; SUBCUTANEOUS EVERY 8 HOURS
Status: DISCONTINUED | OUTPATIENT
Start: 2020-08-25 | End: 2020-08-31

## 2020-08-25 RX ORDER — ACETAMINOPHEN 325 MG/1
650 TABLET ORAL EVERY 6 HOURS
Status: DISCONTINUED | OUTPATIENT
Start: 2020-08-25 | End: 2020-08-31 | Stop reason: HOSPADM

## 2020-08-25 RX ORDER — OXYCODONE HYDROCHLORIDE 10 MG/1
10 TABLET ORAL EVERY 4 HOURS PRN
Status: CANCELLED | OUTPATIENT
Start: 2020-08-25 | End: 2020-08-26

## 2020-08-25 RX ORDER — OXYCODONE HYDROCHLORIDE 5 MG/1
5 TABLET ORAL EVERY 4 HOURS PRN
Status: CANCELLED | OUTPATIENT
Start: 2020-08-25 | End: 2020-08-26

## 2020-08-25 RX ADMIN — IBUPROFEN 400 MG: 400 TABLET, FILM COATED ORAL at 08:08

## 2020-08-25 RX ADMIN — PROPOFOL 25 MCG/KG/MIN: 10 INJECTION, EMULSION INTRAVENOUS at 12:08

## 2020-08-25 RX ADMIN — Medication 0.06 MCG/KG/MIN: at 12:08

## 2020-08-25 RX ADMIN — SODIUM CHLORIDE, SODIUM LACTATE, POTASSIUM CHLORIDE, AND CALCIUM CHLORIDE 500 ML: .6; .31; .03; .02 INJECTION, SOLUTION INTRAVENOUS at 07:08

## 2020-08-25 RX ADMIN — OXYCODONE HYDROCHLORIDE 5 MG: 5 TABLET ORAL at 02:08

## 2020-08-25 RX ADMIN — DEXTROSE AND SODIUM CHLORIDE: 5; .45 INJECTION, SOLUTION INTRAVENOUS at 08:08

## 2020-08-25 RX ADMIN — POTASSIUM CHLORIDE 20 MEQ: 14.9 INJECTION, SOLUTION INTRAVENOUS at 11:08

## 2020-08-25 RX ADMIN — HEPARIN SODIUM 5000 UNITS: 5000 INJECTION INTRAVENOUS; SUBCUTANEOUS at 01:08

## 2020-08-25 RX ADMIN — SODIUM CHLORIDE, SODIUM LACTATE, POTASSIUM CHLORIDE, AND CALCIUM CHLORIDE 500 ML: .6; .31; .03; .02 INJECTION, SOLUTION INTRAVENOUS at 11:08

## 2020-08-25 RX ADMIN — DEXTROSE AND SODIUM CHLORIDE: 5; .45 INJECTION, SOLUTION INTRAVENOUS at 04:08

## 2020-08-25 RX ADMIN — HEPARIN SODIUM 5000 UNITS: 5000 INJECTION INTRAVENOUS; SUBCUTANEOUS at 08:08

## 2020-08-25 RX ADMIN — SODIUM CHLORIDE, SODIUM LACTATE, POTASSIUM CHLORIDE, AND CALCIUM CHLORIDE 500 ML: .6; .31; .03; .02 INJECTION, SOLUTION INTRAVENOUS at 09:08

## 2020-08-25 RX ADMIN — PROPOFOL 20 MCG/KG/MIN: 10 INJECTION, EMULSION INTRAVENOUS at 04:08

## 2020-08-25 NOTE — PROGRESS NOTES
MD @bedside and updated on patient's current status. Alerted to patient's increasing NATALEE drainage (150 over each of the past two hours). Pressure remains stable as do all other VS. CBC sent off early and H/H: 9/28. Will continue to trend and monitor NATALEE drainage/pressure per MD. Will continue to monitor.

## 2020-08-25 NOTE — NURSING TRANSFER
Nursing Transfer Note      8/25/2020     Transfer To: [id number]    Transfer via bed    Transfer with 3L NC to O2, cardiac monitoring    Transported by RN    Medicines sent: none    Chart send with patient: Yes    Notified: son    Patient reassessed at: 8/25/20 1500 (date, time)    Upon arrival to floor: cardiac monitor applied, patient oriented to room, call bell in reach and bed in lowest position

## 2020-08-25 NOTE — PLAN OF CARE
Pt was AAOX4. Stepped down to TSU. Able to answer all questions    -son at bedside  -generalized edema noted. +2 to hands, +3 to feet. Strong pedal pulses  -IVF at 125cc/hr  -Gerard in place post op liver resection surgery, good UOP (see flowsheet) clear yellow  -bs hypoactive, started on ADA diet  -accuchecks AC/HS, wnl  -abdomen with midline incision, staples intact, ICU doctor removed drsg. Drsg well approximated  -RJP drain noted with sanguinous output  -pt was turned q 2 hours with wedge, pt also was able to move self in bed. No pressure areas noted, skin was intact other than incision to abdomen  -VISI placed on pt, VSS. Wearing O2 at 3L nc (pt was extubated at 0800). Spo2 >94%  -no swallowing difficulties,   -orientated to call bell   -heparin q 8 for DVT ppx  -denied pain and refused scheduled tylenol and ibuprofen  -ADA diet initiated

## 2020-08-25 NOTE — ANESTHESIA POSTPROCEDURE EVALUATION
Anesthesia Post Evaluation    Patient: Magaly Hutchinson    Procedure(s) Performed: Procedure(s) (LRB):  EXCISION, MASS, RETROPERITONEUM (Right)    Final Anesthesia Type: general    Patient location during evaluation: PACU  Patient participation: No - Unable to Participate, Intubation  Level of consciousness: sedated  Post-procedure vital signs: reviewed and stable  Pain management: adequate  Airway patency: patent    PONV status at discharge: No PONV  Anesthetic complications: no      Cardiovascular status: hemodynamically stable and blood pressure returned to baseline  Respiratory status: ventilator, intubated and ETT  Hydration status: euvolemic  Follow-up not needed.          Vitals Value Taken Time   /57 08/25/20 0602   Temp 36.8 °C (98.2 °F) 08/25/20 0300   Pulse 54 08/25/20 0632   Resp 14 08/25/20 0632   SpO2 100 % 08/25/20 0632   Vitals shown include unvalidated device data.      Event Time   Out of Recovery 16:19:27         Pain/Ezequiel Score: Pain Rating Prior to Med Admin: 6 (8/24/2020  5:24 PM)  Pain Rating Post Med Admin: 0 (8/24/2020  5:54 PM)  Ezequiel Score: 6 (8/24/2020  4:00 PM)

## 2020-08-25 NOTE — OP NOTE
Certification of Assistant at Surgery       Surgery Date: 8/24/2020     Participating Surgeons:  Surgeon(s) and Role:     * Pa Pressley MD - Primary     * Anders Watters MD - Assisting     * JUICE Potts MD - Resident - Assisting    Procedures:  Procedure(s) (LRB):  EXCISION, MASS, RETROPERITONEUM (Right)    Assistant Surgeon's Certification of Necessity:  I understand that section 1842 (b) (6) (d) of the Social Security Act generally prohibits Medicare Part B reasonable charge payment for the services of assistants at surgery in [facility] hospitals when qualified residents are available to furnish such services. I certify that the services for which payment is claimed were medically necessary, and that no qualified resident was available to perform the services. I further understand that these services are subject to post-payment review by the Medicare carrier.      Anders Watters MD    08/25/2020  12:26 PM

## 2020-08-25 NOTE — PROGRESS NOTES
Pt extubated to 4L NC. RT and RN present. Pt tolerated well, sats 100%, pt resting comfortable. Will continue to monitor.

## 2020-08-25 NOTE — PLAN OF CARE
"      SICU PLAN OF CARE NOTE    Dx: Excision of Liver Mass    Shift Events: Patient remained stable throughout the night. Plan of care was discussed with patient in great detail. There were increasing pressure support requirements. At the beginning of the shift Levo @ .04 and increased to .06, with the peak at .08. A 500mL LR bolus was given at the beginning of the shift to increase patient's MAPs. Initially, there was suspicion of bleeding due to dropping MAPs, blood products required in the OR, and a sudden increase in NATALEE drainage. Serial CBCs were sent off and all remained stable at 9/28. MD discussed possible transition to precedex from Propofol to prepare for extubation. Patient has been significantly bradycardic (HR in low 50s), so reevaluation will be necessary. Patient was bradycardic for a majority of the shift until sedation vacations.     Goals of Care: Extubation this AM: Will begin to wean sedation at shift change.     Neuro: Patient follows commands on as much as 20mcg/kg/min of Propofol    Vital Signs: BP (!) 107/58 (BP Location: Right arm, Patient Position: Lying)   Pulse (!) 56   Temp 98.2 °F (36.8 °C) (Oral)   Resp 15   Ht 5' 6" (1.676 m)   Wt 99.8 kg (220 lb 0.3 oz)   SpO2 100%   Breastfeeding No   BMI 35.51 kg/m²     Respiratory: Vent: AC/VC 40%/5 PEEP    Diet: NPO    Gtts:   Levo @.06  Propofol @30  MIVF 125    Urine Output: 2500cc/shift; Possible contribution to low MAPs.    Drains:   L abdominal NATALEE drain: 800/shift; Last shift it drained approximately 50-60cc/hr. Once the patient was turned to her left side the drainage significantly increase. Remained serosanguinous. MD aware and at bedside.     Labs/Accuchecks: Labs trended and treated appropriately. Accuchecks Q 4hr with no coverage.    Skin: Patient turned with off-going nurse and turned throughout the shift. Foam pads in place and heel bots as well. Mattress inflated.       "

## 2020-08-25 NOTE — PROGRESS NOTES
Admit Note     Met with patient to assess needs. Patient is a 84 y.o.  female, admitted for resection of large liver mass.      Patient admitted from home on 2020 .  At this time, patient presents as alert and oriented x 4, communicative, cooperative, asking and answering questions appropriately and slight difficulty with recall.  At this time, patients caregiver was not present.    Household/Family Systems     Patient resides with patient's granddaughter Leeann Romano, and her great-grandson, Mimi Landers (13)., at [address]  [place] [address].  Support system includes 2 adult sons, and adult grandchildren. Pt has 2 adult sons that live close by. Pt is  and has 2  sons as well.  Patient does not have dependents that are need of being cared for.     Patients primary caregiver is Leeann Romano, patients granddaughter, phone number [phone].  Confirmed patients contact information is [phone] (home);   Telephone Information:   Mobile [phone]   .    During admission, patient's caregiver plans to stay at home.  Confirmed patient and patients caregivers do have access to reliable transportation.    Cognitive Status/Learning     Patient reports reading ability as 8th grade and states patient does not have difficulty with seeing, hearing and memory.  Patient reports patient learns best by hearing and seeing.   Needed: No.   Highest education level: Grade School (0-8)    Vocation/Disability   .  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    Adherence     Patient reports a high level of adherence to patients health care regimen.  Adherence counseling and education provided. Patient verbalizes understanding.    Substance Use    Patient reports the following substance usage.    Tobacco: none, patient denies any use.  Alcohol: none, patient  denies any use.  Illicit Drugs/Non-prescribed Medications: none, patient denies any use.  Patient states clear understanding of the potential impact of substance use.  Substance abstinence/cessation counseling, education and resources provided and reviewed.     Services Utilizing/ADLS    Infusion Service: Prior to admission, patient utilizing? no  Home Health: Prior to admission, patient utilizing? yes - Pt reports that she has received HH PT in the past, but was unsure of the agency.   DME: Prior to admission, yes - Pt reports she has a walker at home.   Pulmonary/Cardiac Rehab: Prior to admission, no  Dialysis:  Prior to admission, no  Transplant Specialty Pharmacy:  Prior to admission, no.    Prior to admission, patient reports patient was independent with ADLS and was not driving.  Patient reports patient is able to care for self at this time..  Patient indicates a willingness to care for self once medically cleared to do so.    Insurance/Medications    Insured by   Payor/Plan Subscr  Sex Relation Sub. Ins. ID Effective Group Num   1. [employer] MANAGE* TALON ALVAREZ 1935 Female  [id number] 1/1/18 [id number]                                   [address] [address]   2. [place]* TALON ALVAREZ 1935 Female  [id number]* 20 [id number]                                   [address] [address]      Primary Insurance (for UNOS reporting): Public Insurance - Medicare FFS (Fee For Service) ([employer])  Secondary Insurance (for UNOS reporting): Public Insurance - Medicaid    Patient reports patient is able to obtain and afford medications at this time and at time of discharge.    Living Will/Healthcare Power of     Patient states patient has a LW and/or HCPA.   provided education regarding LW and HCPA and the completion of forms.    Coping/Mental Health    Patient is coping adequately with the aid of  family members. Pt denies any issues with depression or anxiety.  Patient denies mental health difficulties.      Discharge Planning    At time of discharge, patient plans to return to patient's home under the care of her granddaughter, Leeann.  Patients granddaughter will transport patient.  Per rounds today, expected discharge date has not been medically determined at this time. Patient and patients caregiver  verbalize understanding and are involved in treatment planning and discharge process.    Additional Concerns     providing ongoing psychosocial support, education, resources and d/c planning as needed. Patient denies additional needs and/or concerns at this time. Patient verbalizes understanding and agreement with information reviewed, social work availability, and how to access available resources as needed.  remains available.

## 2020-08-25 NOTE — PROGRESS NOTES
MD @bedside updated on patient's current status. MAPs in the low 60s. 500mL LR bolus administered per MD. Will continue to monitor.

## 2020-08-25 NOTE — PROGRESS NOTES
SICU Staff Addendum DOS 8/25/2020  I have reviewed and concur with the resident's history, physical, assessment, and plan.  I have personally interviewed and examined the patient at bedside.  See below for any additional findings.    Reason for admission:  <principal problem not specified>  Present on Admission:   Liver mass, right lobe      Goals for Today:   - Stable overnight following extensive mass excision; no additional blood products needed overnight  - Ready for extubation this AM; will proceed and monitor in ICU through the rest of the morning    35 minutes of critical care time was spent personally by me on the following activities: development of treatment plan with patient or surrogate and bedside caregivers, discussions with consultants, evaluation of patient's response to treatment, examination of patient, ordering and performing treatments and interventions, ordering and review of laboratory studies, ordering and review of radiographic studies, pulse oximetry, re-evaluation of patient's condition.  This critical care time did not overlap with that of any other provider or involve time for any procedures.    Sukhjinder Johnson MD  Anesthesia Critical Care  Spectra [id number]          [facility]  Critical Care - Surgery  History & Physical    Patient Name: Magaly Hutchinson  MRN: [id number]  Admission Date: 8/24/2020  Code Status: No Order  Attending Physician: Pa Pressley MD   Primary Care Provider: Primary Doctor No   Principal Problem: <principal problem not specified>    Subjective:     HPI: 84 F s/p resection of large liver mass. She has noted abdominal distension over past several months/year which she attributed to weight gain. A few months ago she noted a pre-syncopal episode, abdominal pain. She was ultimately diagnosed with very large right lobe liver mass extending into the right retroperitoneum with displacement of right kidney and colon and compression of vena cava. She has also  noted increased swelling in her legs, right greater than left.    Hospital/ICU Course:   NAEON. Maps began to drop to low 60s, 500cc LR bolus given and propofol lowered with good response in MAP and UOP.   Q4 CBCs with H/H stable >9   NATALEE with 860cc out since OR, sanguinous   Intubated; SBT this am  Minimal vent settings (40%; PEEP 5)    Follow-up For: Procedure(s) (LRB):  EXCISION, MASS, RETROPERITONEUM (Right)    Post-Operative Day: Day of Surgery  Past Medical History:   Diagnosis Date    Anemia     Arthritis     Diabetes mellitus     Diabetes mellitus, type 2     Hyperlipidemia     Hypertension     Neuromuscular disorder      Past Surgical History:   Procedure Laterality Date    BLADDER REPAIR      lift     CHOLECYSTECTOMY      2006/2007 ?    HYSTERECTOMY      1972    JOINT REPLACEMENT      2003    KNEE SURGERY       Review of patient's allergies indicates:  No Known Allergies    Family History     Problem Relation (Age of Onset)    No Known Problems Mother, Father        Tobacco Use    Smoking status: Never Smoker    Smokeless tobacco: Never Used   Substance and Sexual Activity    Alcohol use: No    Drug use: Never    Sexual activity: Not on file      Review of Systems   HENT: Positive for hearing loss.      Objective:     Vital Signs (Most Recent):  Temp: 98.2 °F (36.8 °C) (08/25/20 0300)  Pulse: (!) 53 (08/25/20 0600)  Resp: 16 (08/25/20 0600)  BP: (!) 106/57 (08/25/20 0600)  SpO2: 100 % (08/25/20 0600) Vital Signs (24h Range):  Temp:  [98.2 °F (36.8 °C)-99.1 °F (37.3 °C)] 98.2 °F (36.8 °C)  Pulse:  [53-80] 53  Resp:  [7-26] 16  SpO2:  [95 %-100 %] 100 %  BP: ()/(52-76) 106/57  Arterial Line BP: ()/(42-62) 107/45     Weight: 99.8 kg (220 lb 0.3 oz)  Body mass index is 35.51 kg/m².    Intake/Output Summary (Last 24 hours) at 8/25/2020 0611  Last data filed at 8/25/2020 0600  Gross per 24 hour   Intake 20773 ml   Output 92841 ml   Net 136 ml     Physical Exam  Constitutional:        Interventions: She is sedated and intubated.   Pulmonary:      Effort: She is intubated.   Abdominal:      Comments: NATALEE in RUQ      Vents:Vent Mode: A/C (08/25/20 0320)  Ventilator Initiated: Yes (08/24/20 1301)  Set Rate: 12 BPM (08/25/20 0320)  Vt Set: 400 mL (08/25/20 0320)  Pressure Support: 0 cmH20 (08/24/20 1505)  PEEP/CPAP: 5 cmH20 (08/25/20 0320)  Oxygen Concentration (%): 40 (08/25/20 0600)  Peak Airway Pressure: 17 cmH2O (08/25/20 0320)  Plateau Pressure: 11 cmH20 (08/25/20 0320)  Total Ve: 8.52 mL (08/25/20 0320)  F/VT Ratio<105 (RSBI): (!) 50.85 (08/25/20 0320)  Lines/Drains/Airways     Central Venous Catheter Line             Introducer with Double Lumen 08/24/20 0747 less than 1 day    Percutaneous Central Line Insertion/Assessment - Triple Lumen  08/24/20 0754 less than 1 day          Drain                 Closed/Suction Drain 08/24/20 1210 Right RLQ Bulb 19 Fr. less than 1 day         Urethral Catheter 08/24/20 0725 Non-latex;Straight-tip 16 Fr. less than 1 day          Airway                 Airway - Non-Surgical 08/24/20 0720 less than 1 day          Arterial Line                 Arterial Line 08/24/20 0720 Left Radial less than 1 day          Peripheral Intravenous Line                 Peripheral IV - Single Lumen 08/24/20 0700 22 G Left Hand less than 1 day         Peripheral IV - Single Lumen 08/24/20 0738 18 G Right Wrist less than 1 day              Significant Labs:  CBC/Anemia Profile:  Recent Labs   Lab 08/24/20 2000 08/24/20  2343 08/25/20  0303   WBC 14.90* 14.18* 13.91*   HGB 9.3* 9.0* 9.5*   HCT 28.0* 27.5* 28.3*   * 126* 130*   MCV 90 90 89   RDW 15.8* 16.3* 16.3*      Chemistries:  Recent Labs   Lab 08/24/20  1023  08/24/20 2000 08/24/20  2343 08/25/20  0303      < > 141 139 142   K 4.0   < > 4.3 4.3 4.2      < > 110 109 112*   CO2 18*   < > 24 25 24   BUN 15   < > 19 20 20   CREATININE 0.6   < > 0.7 0.7 0.7   CALCIUM 8.2*   < > 8.6* 8.3* 8.5*   ALBUMIN  --    < >  2.6* 2.4* 2.4*   PROT  --    < > 4.7* 4.7* 4.8*   BILITOT  --    < > 3.2* 2.8* 2.4*   ALKPHOS  --    < > 41* 43* 42*   ALT  --    < > 38 28 28   AST  --    < > 105* 100* 85*   MG 2.1  --   --   --   --     < > = values in this interval not displayed.      Significant Imaging: I have reviewed and interpreted all pertinent imaging results/findings within the past 24 hours.    Assessment/Plan:     84 year old female patient who is s/p resection of large liver mass on 8/24/2020 during which she had significant blood loss requiring 11 u RBCs, 11u FFP, 2 cryo, and 2 of platelets.     Neuro:  - Sedated with 30 of propofol; wean to extubate this am   - Pain control    CV:  - Levo at 0.07; vaso off   - Wean levo for map >60    Pulm:  - Intubated; ACVC at 40% and PEEP 5  - SBT this am; wean to extubate if acceptable parameters   - ABGs prn   - Daily CXR    Renal:  - Q4 hour BMP; trend Cr (0.7 this am)  - Strict I/Os; UOP remained >150/hr overnight after bolus of LR given     FENGI:  - NPO  - q4hr BMP; replace lytes as indicated  - mIVF @125   - NATALEE drain to bulb suction; monitor output; 860mL SA out since OR yesterday     Heme/ID:  - required 11u RBCs intraoperatively  - Q4 hr CBC to trend H/H; stable >9   - transfuse as indicated    Dispo: SICU care with possible step down to transplant floor once extubated       Oralia Mcmahon MD  [employer] General Surgery - PGY 2   08/25/2020 1:06 PM

## 2020-08-26 LAB
ALBUMIN SERPL BCP-MCNC: 1.9 G/DL (ref 3.5–5.2)
ALP SERPL-CCNC: 50 U/L (ref 55–135)
ALT SERPL W/O P-5'-P-CCNC: 15 U/L (ref 10–44)
ANION GAP SERPL CALC-SCNC: 6 MMOL/L (ref 8–16)
AST SERPL-CCNC: 29 U/L (ref 10–40)
BASOPHILS # BLD AUTO: 0.04 K/UL (ref 0–0.2)
BASOPHILS # BLD AUTO: 0.05 K/UL (ref 0–0.2)
BASOPHILS NFR BLD: 0.4 % (ref 0–1.9)
BASOPHILS NFR BLD: 0.4 % (ref 0–1.9)
BILIRUB FLD-MCNC: 2.4 MG/DL
BILIRUB SERPL-MCNC: 0.7 MG/DL (ref 0.1–1)
BLD PROD TYP BPU: NORMAL
BLOOD UNIT EXPIRATION DATE: NORMAL
BLOOD UNIT TYPE CODE: 6200
BLOOD UNIT TYPE: NORMAL
BUN SERPL-MCNC: 15 MG/DL (ref 8–23)
CALCIUM SERPL-MCNC: 7.7 MG/DL (ref 8.7–10.5)
CHLORIDE SERPL-SCNC: 108 MMOL/L (ref 95–110)
CO2 SERPL-SCNC: 25 MMOL/L (ref 23–29)
CODING SYSTEM: NORMAL
CREAT SERPL-MCNC: 0.7 MG/DL (ref 0.5–1.4)
DIFFERENTIAL METHOD: ABNORMAL
DIFFERENTIAL METHOD: ABNORMAL
DISPENSE STATUS: NORMAL
EOSINOPHIL # BLD AUTO: 0.3 K/UL (ref 0–0.5)
EOSINOPHIL # BLD AUTO: 0.3 K/UL (ref 0–0.5)
EOSINOPHIL NFR BLD: 2.5 % (ref 0–8)
EOSINOPHIL NFR BLD: 3.6 % (ref 0–8)
ERYTHROCYTE [DISTWIDTH] IN BLOOD BY AUTOMATED COUNT: 17.1 % (ref 11.5–14.5)
ERYTHROCYTE [DISTWIDTH] IN BLOOD BY AUTOMATED COUNT: 17.5 % (ref 11.5–14.5)
EST. GFR  (AFRICAN AMERICAN): >60 ML/MIN/1.73 M^2
EST. GFR  (NON AFRICAN AMERICAN): >60 ML/MIN/1.73 M^2
GLUCOSE SERPL-MCNC: 85 MG/DL (ref 70–110)
HCT VFR BLD AUTO: 22.5 % (ref 37–48.5)
HCT VFR BLD AUTO: 25 % (ref 37–48.5)
HCT VFR BLD AUTO: 25.5 % (ref 37–48.5)
HGB BLD-MCNC: 7.5 G/DL (ref 12–16)
HGB BLD-MCNC: 8 G/DL (ref 12–16)
HGB BLD-MCNC: 8.6 G/DL (ref 12–16)
IMM GRANULOCYTES # BLD AUTO: 0.07 K/UL (ref 0–0.04)
IMM GRANULOCYTES # BLD AUTO: 0.07 K/UL (ref 0–0.04)
IMM GRANULOCYTES NFR BLD AUTO: 0.6 % (ref 0–0.5)
IMM GRANULOCYTES NFR BLD AUTO: 0.7 % (ref 0–0.5)
LYMPHOCYTES # BLD AUTO: 1.4 K/UL (ref 1–4.8)
LYMPHOCYTES # BLD AUTO: 1.7 K/UL (ref 1–4.8)
LYMPHOCYTES NFR BLD: 15 % (ref 18–48)
LYMPHOCYTES NFR BLD: 15.1 % (ref 18–48)
MCH RBC QN AUTO: 29.6 PG (ref 27–31)
MCH RBC QN AUTO: 30.1 PG (ref 27–31)
MCHC RBC AUTO-ENTMCNC: 32 G/DL (ref 32–36)
MCHC RBC AUTO-ENTMCNC: 33.3 G/DL (ref 32–36)
MCV RBC AUTO: 90 FL (ref 82–98)
MCV RBC AUTO: 93 FL (ref 82–98)
MONOCYTES # BLD AUTO: 0.5 K/UL (ref 0.3–1)
MONOCYTES # BLD AUTO: 0.6 K/UL (ref 0.3–1)
MONOCYTES NFR BLD: 4.8 % (ref 4–15)
MONOCYTES NFR BLD: 5.2 % (ref 4–15)
NEUTROPHILS # BLD AUTO: 7.1 K/UL (ref 1.8–7.7)
NEUTROPHILS # BLD AUTO: 8.7 K/UL (ref 1.8–7.7)
NEUTROPHILS NFR BLD: 75.5 % (ref 38–73)
NEUTROPHILS NFR BLD: 76.2 % (ref 38–73)
NRBC BLD-RTO: 0 /100 WBC
NRBC BLD-RTO: 0 /100 WBC
NUM UNITS TRANS FFP: NORMAL
PHOSPHATE SERPL-MCNC: 2.7 MG/DL (ref 2.7–4.5)
PHOSPHATE SERPL-MCNC: 2.7 MG/DL (ref 2.7–4.5)
PLATELET # BLD AUTO: 80 K/UL (ref 150–350)
PLATELET # BLD AUTO: 87 K/UL (ref 150–350)
PMV BLD AUTO: 11.1 FL (ref 9.2–12.9)
PMV BLD AUTO: 11.2 FL (ref 9.2–12.9)
POCT GLUCOSE: 103 MG/DL (ref 70–110)
POCT GLUCOSE: 104 MG/DL (ref 70–110)
POCT GLUCOSE: 93 MG/DL (ref 70–110)
POCT GLUCOSE: 96 MG/DL (ref 70–110)
POTASSIUM SERPL-SCNC: 3.6 MMOL/L (ref 3.5–5.1)
PROT SERPL-MCNC: 4.3 G/DL (ref 6–8.4)
RBC # BLD AUTO: 2.49 M/UL (ref 4–5.4)
RBC # BLD AUTO: 2.7 M/UL (ref 4–5.4)
SODIUM SERPL-SCNC: 139 MMOL/L (ref 136–145)
SPECIMEN SOURCE: NORMAL
WBC # BLD AUTO: 11.4 K/UL (ref 3.9–12.7)
WBC # BLD AUTO: 9.4 K/UL (ref 3.9–12.7)

## 2020-08-26 PROCEDURE — 25000003 PHARM REV CODE 250: Performed by: TRANSPLANT SURGERY

## 2020-08-26 PROCEDURE — 20600001 HC STEP DOWN PRIVATE ROOM

## 2020-08-26 PROCEDURE — S5010 5% DEXTROSE AND 0.45% SALINE: HCPCS | Performed by: STUDENT IN AN ORGANIZED HEALTH CARE EDUCATION/TRAINING PROGRAM

## 2020-08-26 PROCEDURE — 25000003 PHARM REV CODE 250: Performed by: STUDENT IN AN ORGANIZED HEALTH CARE EDUCATION/TRAINING PROGRAM

## 2020-08-26 PROCEDURE — 63600175 PHARM REV CODE 636 W HCPCS: Performed by: STUDENT IN AN ORGANIZED HEALTH CARE EDUCATION/TRAINING PROGRAM

## 2020-08-26 PROCEDURE — S5010 5% DEXTROSE AND 0.45% SALINE: HCPCS | Performed by: NURSE PRACTITIONER

## 2020-08-26 PROCEDURE — 25000003 PHARM REV CODE 250: Performed by: NURSE PRACTITIONER

## 2020-08-26 PROCEDURE — 85025 COMPLETE CBC W/AUTO DIFF WBC: CPT

## 2020-08-26 PROCEDURE — 97530 THERAPEUTIC ACTIVITIES: CPT

## 2020-08-26 PROCEDURE — 85018 HEMOGLOBIN: CPT

## 2020-08-26 PROCEDURE — 84100 ASSAY OF PHOSPHORUS: CPT

## 2020-08-26 PROCEDURE — 85014 HEMATOCRIT: CPT

## 2020-08-26 PROCEDURE — 97165 OT EVAL LOW COMPLEX 30 MIN: CPT

## 2020-08-26 PROCEDURE — 27000221 HC OXYGEN, UP TO 24 HOURS

## 2020-08-26 PROCEDURE — 97162 PT EVAL MOD COMPLEX 30 MIN: CPT

## 2020-08-26 PROCEDURE — 36415 COLL VENOUS BLD VENIPUNCTURE: CPT

## 2020-08-26 PROCEDURE — 63600175 PHARM REV CODE 636 W HCPCS: Performed by: PHYSICIAN ASSISTANT

## 2020-08-26 PROCEDURE — 80053 COMPREHEN METABOLIC PANEL: CPT

## 2020-08-26 PROCEDURE — 25000003 PHARM REV CODE 250: Performed by: PHYSICIAN ASSISTANT

## 2020-08-26 PROCEDURE — 99233 SBSQ HOSP IP/OBS HIGH 50: CPT | Mod: ,,, | Performed by: NURSE PRACTITIONER

## 2020-08-26 PROCEDURE — 94761 N-INVAS EAR/PLS OXIMETRY MLT: CPT

## 2020-08-26 PROCEDURE — 99233 PR SUBSEQUENT HOSPITAL CARE,LEVL III: ICD-10-PCS | Mod: ,,, | Performed by: NURSE PRACTITIONER

## 2020-08-26 RX ORDER — MIDODRINE HYDROCHLORIDE 5 MG/1
5 TABLET ORAL 3 TIMES DAILY
Status: DISCONTINUED | OUTPATIENT
Start: 2020-08-26 | End: 2020-08-31 | Stop reason: HOSPADM

## 2020-08-26 RX ORDER — IBUPROFEN 400 MG/1
400 TABLET ORAL 3 TIMES DAILY
Status: DISCONTINUED | OUTPATIENT
Start: 2020-08-26 | End: 2020-08-29

## 2020-08-26 RX ORDER — ENOXAPARIN SODIUM 100 MG/ML
40 INJECTION SUBCUTANEOUS DAILY
Qty: 5.6 ML | Refills: 0 | Status: SHIPPED | OUTPATIENT
Start: 2020-08-26 | End: 2020-08-31 | Stop reason: SDUPTHER

## 2020-08-26 RX ADMIN — DEXTROSE AND SODIUM CHLORIDE: 5; .45 INJECTION, SOLUTION INTRAVENOUS at 05:08

## 2020-08-26 RX ADMIN — HEPARIN SODIUM 5000 UNITS: 5000 INJECTION INTRAVENOUS; SUBCUTANEOUS at 08:08

## 2020-08-26 RX ADMIN — HEPARIN SODIUM 5000 UNITS: 5000 INJECTION INTRAVENOUS; SUBCUTANEOUS at 02:08

## 2020-08-26 RX ADMIN — HEPARIN SODIUM 5000 UNITS: 5000 INJECTION INTRAVENOUS; SUBCUTANEOUS at 05:08

## 2020-08-26 RX ADMIN — IBUPROFEN 400 MG: 400 TABLET, FILM COATED ORAL at 03:08

## 2020-08-26 RX ADMIN — MIDODRINE HYDROCHLORIDE 5 MG: 5 TABLET ORAL at 11:08

## 2020-08-26 RX ADMIN — DIBASIC SODIUM PHOSPHATE, MONOBASIC POTASSIUM PHOSPHATE AND MONOBASIC SODIUM PHOSPHATE 2 TABLET: 852; 155; 130 TABLET ORAL at 11:08

## 2020-08-26 RX ADMIN — DIBASIC SODIUM PHOSPHATE, MONOBASIC POTASSIUM PHOSPHATE AND MONOBASIC SODIUM PHOSPHATE 2 TABLET: 852; 155; 130 TABLET ORAL at 08:08

## 2020-08-26 RX ADMIN — DOCUSATE SODIUM 100 MG: 100 CAPSULE, LIQUID FILLED ORAL at 03:08

## 2020-08-26 RX ADMIN — MIDODRINE HYDROCHLORIDE 5 MG: 5 TABLET ORAL at 08:08

## 2020-08-26 RX ADMIN — DEXTROSE AND SODIUM CHLORIDE: 5; .45 INJECTION, SOLUTION INTRAVENOUS at 03:08

## 2020-08-26 RX ADMIN — BISACODYL 10 MG: 5 TABLET, COATED ORAL at 12:08

## 2020-08-26 RX ADMIN — DOCUSATE SODIUM 100 MG: 100 CAPSULE, LIQUID FILLED ORAL at 08:08

## 2020-08-26 RX ADMIN — ACETAMINOPHEN 650 MG: 325 TABLET ORAL at 05:08

## 2020-08-26 RX ADMIN — DOCUSATE SODIUM 100 MG: 100 CAPSULE, LIQUID FILLED ORAL at 09:08

## 2020-08-26 RX ADMIN — IBUPROFEN 400 MG: 400 TABLET, FILM COATED ORAL at 08:08

## 2020-08-26 RX ADMIN — MIDODRINE HYDROCHLORIDE 5 MG: 5 TABLET ORAL at 03:08

## 2020-08-26 RX ADMIN — BISACODYL 10 MG: 5 TABLET, COATED ORAL at 08:08

## 2020-08-26 RX ADMIN — IBUPROFEN 400 MG: 400 TABLET, FILM COATED ORAL at 09:08

## 2020-08-26 NOTE — NURSING
Notified DANTE Lewis of pt's bp of 87/50. PA ordered 500cc bolus of LR to be run over 1 hour and CBC, Phos labs. Will continue to monitor.

## 2020-08-26 NOTE — PT/OT/SLP EVAL
"Physical Therapy Evaluation    Patient Name:  Magaly Hutchinson   MRN:  [id number]    Recommendations:     Discharge Recommendations:  home with home health   Discharge Equipment Recommendations: none   Barriers to discharge: decreased functional mobility    Assessment:     Magaly Hutchinson is a 84 y.o. female admitted with a medical diagnosis of Liver mass, right lobe.  She presents with the following impairments/functional limitations:  weakness, impaired endurance, impaired self care skills, impaired functional mobilty, gait instability, impaired balance, pain.  Tolerated session c c/o fatigue and pain.  Performed mobility c CGA-min A. Pt able to take few steps to bedside chair c HHAx2 and demo decreased gait speed, FFP, B flat foot contact and c/o fear of falling.  Pt safe to amb c assistance of 1x person + RW.  Pt would benefit from continued skilled acute PT 3x/wk to improve functional mobility.  Recommending pt receive PT services in HH setting following d/c from hospital once medically cleared.      Rehab Prognosis: Good; patient would benefit from acute skilled PT services to address these deficits and reach maximum level of function.    Recent Surgery: Procedure(s) (LRB):  EXCISION, MASS, RETROPERITONEUM (Right) 2 Days Post-Op    Plan:     During this hospitalization, patient to be seen 3 x/week to address the identified rehab impairments via gait training, therapeutic activities, neuromuscular re-education, therapeutic exercises and progress toward the following goals:    · Plan of Care Expires:  09/20/20    Subjective     Chief Complaint: fatigue, pain  Patient/Family Comments/goals: "I don't think you can do this by yourself." - re to transferring pt to bedside chair  Pain/Comfort:  · Pain Rating 1: 5/10(reports 5/10 R abdominal pain)    Patients cultural, spiritual, Advent conflicts given the current situation: no    Living Environment:  Pt lives c granddaughter in trailer c ramp.  PTA has aide 2x/wk for 2hrs " (mon/thu) assists c bathing.  PTA no falls and enjoys word puzzle  Prior to admission, patients level of function was requiring assistance for ADLs and using rollator for mobility.  Equipment used at home: bedside commode, rollator.  DME owned (not currently used): none.  Upon discharge, patient will have assistance from family.    Objective:     Communicated with RN prior to session.  Patient found HOB elevated with telemetry, NATALEE drain, hernandez catheter, oxygen, peripheral IV  upon PT entry to room.    General Precautions: Standard, fall   Orthopedic Precautions:N/A   Braces: N/A     Exams:  · Cognitive Exam:  Patient is oriented to Person, Place, Time and Situation  · RLE ROM: WFL  · RLE Strength: WFL  · LLE ROM: WFL  · LLE Strength: WFL    Functional Mobility:  · Bed Mobility:     · Rolling Left:  minimum assistance  · Scooting: contact guard assistance  · Supine to Sit: minimum assistance  · Transfers:     · Sit to Stand:  contact guard assistance with hand-held assist  · Bed to Chair: contact guard assistance with  hand-held assist  using  Step Transfer  · Gait: 5ft c HHAx2 CGA  · Balance: sitting (S); standing (CGA)    Therapeutic Activities and Exercises:  Pt educated on: PT role/POC; safety c mobility; benefits of OOB activities; performing therex; d/c recs - v/u  -sat EOB x5mins  -sit<>stand 3x  -therex (LAQ, hip flex, AP)    AM-PAC 6 CLICK MOBILITY  Total Score:17     Patient left up in chair with all lines intact, call button in reach and RN notified.    GOALS:   Multidisciplinary Problems     Physical Therapy Goals        Problem: Physical Therapy Goal    Goal Priority Disciplines Outcome Goal Variances Interventions   Physical Therapy Goal     PT, PT/OT Ongoing, Progressing     Description: Goals to be met by: 2020     Patient will increase functional independence with mobility by performin. Supine to sit with Set-up [place]  2. Sit to supine with Set-up [place]  3. Sit to stand  transfer with Supervision  4. Bed to chair transfer with Supervision using Rolling Walker  5. Gait  x 120 feet with SBAusing Rolling Walker.                      History:     Past Medical History:   Diagnosis Date    Anemia     Arthritis     Diabetes mellitus     Diabetes mellitus, type 2     Hyperlipidemia     Hypertension     Neuromuscular disorder        Past Surgical History:   Procedure Laterality Date    BLADDER REPAIR      lift     CHOLECYSTECTOMY      2006/2007 ?    HYSTERECTOMY      1972    JOINT REPLACEMENT      2003    KNEE SURGERY      RETROPERITONEAL MASS EXCISION Right 8/24/2020    Procedure: EXCISION, MASS, RETROPERITONEUM;  Surgeon: Pa Pressley MD;  Location: [facility] OR [address] [place] [address];  Service: Transplant;  Laterality: Right;       Time Tracking:     PT Received On: 08/26/20  PT Start Time: 0811     PT Stop Time: 0830  PT Total Time (min): 19 min     Billable Minutes: Evaluation 10 min and Therapeutic Activity 8 min      Jamir Ford, PT  08/26/2020

## 2020-08-26 NOTE — PLAN OF CARE
Problem: Physical Therapy Goal  Goal: Physical Therapy Goal  Description: Goals to be met by: 2020     Patient will increase functional independence with mobility by performin. Supine to sit with Set-up [place]  2. Sit to supine with Set-up [place]  3. Sit to stand transfer with Supervision  4. Bed to chair transfer with Supervision using Rolling Walker  5. Gait  x 120 feet with SBAusing Rolling Walker.     Outcome: Ongoing, Progressing   Eval completed and POC established    Jamir Ford PT,DPT  2020

## 2020-08-26 NOTE — PT/OT/SLP EVAL
"Occupational Therapy   Evaluation    Name: Magaly Hutchinson  MRN: [id number]  Admitting Diagnosis:  Liver mass, right lobe 2 Days Post-Op    Recommendations:     Discharge Recommendations: home health OT  Discharge Equipment Recommendations:  walker, rolling  Barriers to discharge:  None    Assessment:     Magaly Hutchinson is a 84 y.o. female with a medical diagnosis of Liver mass, right lobe. Pt. currently demonstrates decreased (I) with ADLs, functional mobility & t/fs as well as decreased overall strength, ROM, endurance and balance. Pt would benefit from skilled OT services to address these deficits and to facilitate improving (I) with daily tasks. Performance deficits affecting function: impaired endurance, weakness, impaired self care skills, impaired functional mobilty, gait instability, impaired balance, decreased coordination, edema.      Rehab Prognosis: Good; patient would benefit from acute skilled OT services to address these deficits and reach maximum level of function.       Plan:     Patient to be seen 4 x/week to address the above listed problems via self-care/home management, therapeutic activities, therapeutic exercises  · Plan of Care Expires: 09/25/20  · Plan of Care Reviewed with: patient, caregiver    Subjective     "I'm 25# lighter after that surgery."    Occupational Profile:  Living Environment: Pt lives c granddaughter in trailer c ramp.  PTA has aide 2x/wk for 2hrs (mon/thu) assists c bathing.  PTA no falls and enjoys word puzzle  Prior to admission, patients level of function was requiring assistance with bathing, tub t/fs and LBD and using rollator for mobility.  Equipment used at home: bedside commode, rollator.  DME owned (not currently used): none.  Upon discharge, patient will have assistance from family.    Pain/Comfort:  · Pain Rating 1: 0/10    Patients cultural, spiritual, Catholic conflicts given the current situation:      Objective:     Communicated with: rn prior to session.  " Patient found up in chair with hernandez catheter, oxygen, peripheral IV upon OT entry to room.    General Precautions: Standard, fall     Occupational Performance:    Bed Mobility:    · Up in chair.    Functional Mobility/Transfers:  · Patient completed Sit <> Stand Transfer with minimum assistance  with  rolling walker   · Functional Mobility: Took 3 steps forward/back with CGA/RW.    Activities of Daily Living:  · Feeding:  independence  · Grooming: supervision seated  · Lower Body Dressing: maximal assistance     Cognitive/Visual Perceptual:  Cognitive/Psychosocial Skills:     -       Oriented to: Person, Place, Time and Situation   -       Safety awareness/insight to disability: intact     Physical Exam:  BUE AROM/MMT: Unable to get R shld past 90* - uses LUE to assist. R elbow/hand WFL 4/5.   LUE WNL    AMPAC 6 Click ADL:  AMPAC Total Score: 17    Treatment & Education:  UE ROM/MMT  Bed mobility training / assessment  Functional mobility assessment  Sit/standing balance assessment  Educated on importance of sitting OOB in bedside chair to promote increased strength, endurance & breathing.  Discussed OT POC / Post-acute plan  Education:    Patient left up in chair with all lines intact and call button in reach    GOALS:   Multidisciplinary Problems     Occupational Therapy Goals        Problem: Occupational Therapy Goal    Goal Priority Disciplines Outcome Interventions   Occupational Therapy Goal     OT, PT/OT Ongoing, Progressing    Description: Goals to be met by: 9/2/20    Patient will increase functional independence with ADLs by performing:    LE Dressing with Minimal Assistance and Assistive Devices as needed.  Grooming while standing at sink with Supervision.  Toileting from toilet with Contact Guard Assistance for hygiene and clothing management.   Supine to sit with Supervision.  Toilet transfer to toilet with Supervision.                     History:     Past Medical History:   Diagnosis Date    Anemia      Arthritis     Diabetes mellitus     Diabetes mellitus, type 2     Hyperlipidemia     Hypertension     Neuromuscular disorder        Past Surgical History:   Procedure Laterality Date    BLADDER REPAIR      lift     CHOLECYSTECTOMY      2006/2007 ?    HYSTERECTOMY      1972    JOINT REPLACEMENT      2003    KNEE SURGERY      RETROPERITONEAL MASS EXCISION Right 8/24/2020    Procedure: EXCISION, MASS, RETROPERITONEUM;  Surgeon: Pa Pressley MD;  Location: [address];  Service: Transplant;  Laterality: Right;       Time Tracking:     OT Date of Treatment: 08/26/20  OT Start Time: 1137  OT Stop Time: 1154  OT Total Time (min): 17 min    Billable Minutes:Evaluation 17    MARYBETH Hernandez  8/26/2020

## 2020-08-26 NOTE — ASSESSMENT & PLAN NOTE
- s/p massive cystic mass resection 8/24 with large volume blood loss intra op requiring 11 unit PRBCs, 11 units FFP, 1 pk plts.  - pt hypotensive immediately post op and on transfer to TSU.  - Required 500 ml bolus of LR.  - BP improving. Continue IVFs at 50 ml/hr.  - Start midodrine 5 mg tid.  - Tolerating diet, pain controlled with Tylenol/Ibuprofen.  - Encouraged oob, ambulation, working with PT.  - Gerard catheter removed 8/26.  - NATALEE drain in place.  - Monitor.

## 2020-08-26 NOTE — ASSESSMENT & PLAN NOTE
- H&H decreased this AM. Likely dilutional.  - Repeat improved.  - No need for blood transfusion.  - Monitor.

## 2020-08-26 NOTE — HPI
Ms. Magaly Hutchinson is a 84 year old female with hx of abdominal distention over the past several months which she attributed to weight gain. 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 She has also noted increased edema to her lower extremities, right greater than left.

## 2020-08-26 NOTE — HOSPITAL COURSE
Pt is s/p massive cystic mass resection on 8/24/20. Pt with large right retroperitoneum mass excision with significant blood loss intra op and administration of 11 units PRBCs, 11 units FFP, 2 packs of platelets, and 2 CRYO. Post op pt hypotensive on PACU and upon transfer to TSU. Required 500 ml bolus of LR overnight. BP improved slightly since bolus and starting Midodrine.    Interval history: No acute events overnight. Pt feeling better. Progressing well post-op. Pain controlled, having BMs, tolerating diet and ambulating with assistance. Gerard catheter removed and pt voiding well via pure wick. IVFs dc'd. Diurese with lasix IV. Albumin ordered x 3 doses 8/27 w/ good response. Pt tolerating diet. Denies nausea/vomiting. Continue stool regimen. Monitor.

## 2020-08-26 NOTE — NURSING
Pt AAOx4, VSS, afebrile. H/H stable. Fluids decreased & infusing at 50 mL/hr. Midodrine TID added for BP control. Pt will be receiving Lovenox x 2w. BG checks, no SSI coverage needed. Pt requesting Gerard to remain in place until jesús, team made aware. Ok to remove Gerard jesús 0600. Pt got up to chair with PT/OT today. Bed in low/locked position, call light/personal belongings within reach, non-slip socks on when OOB, pt remains free from falls, WCTM.

## 2020-08-26 NOTE — PROGRESS NOTES
[facility]  Liver Transplant  Progress Note    Patient Name: Magaly Hutchinson  MRN: [id number]  Admission Date: 8/24/2020  Hospital Length of Stay: 2 days  Code Status: No Order  Primary Care Provider: Primary Doctor No  Post-Operative Day:     ORGAN:     Disease Etiology:   Donor Type:     CDC High Risk:     Donor CMV Status:   Donor CMV Status:   Donor HBcAB:     Donor HCV Status:     Donor HBV BENJAMIN: Organ record is missing.  Donor HCV BENJAMIN: Organ record is missing.  Whole or Partial:   Biliary Anastomosis:   Arterial Anatomy:   Subjective:     History of Present Illness:  Ms. Magaly Hutchinson is a 84 year old female with hx of abdominal distention over the past several months which she attributed to weight gain. 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 She has also noted increased edema to her lower extremities, right greater than left.     Hospital Course:  Pt is s/p massive cystic mass resection on 8/24/20. Pt with large right retroperitoneum mass excision with significant blood loss intra op and administration of 11 units PRBCs, 11 units FFP, 2 packs of platelets, and 2 CRYO. Post op pt hypotensive on PACU and upon transfer to TSU. Required 500 ml bolus of LR overnight. BP improved slightly. Today, H&H trended down to 7.5/22.5 thought to be dilutional. Repeat 8.6/25.5. D51/2 NS decreased to 50 ml/hr and midodrine started. Gerard catheter removed. Pain controlled with Tylenol and Ibuprofen. She doesn't like the way Oxycodone makes her feel. Pt will need Lovenox x 2 weeks. Continue bowel regimen. Monitor.     Scheduled Meds:   acetaminophen  650 mg Oral Q6H    bisacodyL  10 mg Oral QHS    docusate sodium  100 mg Oral TID    heparin (porcine)  5,000 Units Subcutaneous Q8H    ibuprofen  400 mg Oral TID    k phos di & mono-sod phos mono  500 mg Oral BID    midodrine   5 mg Oral TID     Continuous Infusions:   dextrose 5 % and 0.45 % NaCl 50 mL/hr at 08/26/20 1051     PRN Meds:ceFAZolin (ANCEF) IVPB, dextrose 50%, glucagon (human recombinant), insulin aspart U-100, oxyCODONE, oxyCODONE    Review of Systems   Constitutional: Positive for activity change. Negative for appetite change, chills, fatigue and fever.   HENT: Negative for congestion and facial swelling.    Eyes: Negative for pain, discharge and visual disturbance.   Respiratory: Negative for cough, chest tightness, shortness of breath and wheezing.    Cardiovascular: Positive for leg swelling. Negative for chest pain and palpitations.   Gastrointestinal: Positive for abdominal distention and abdominal pain. Negative for constipation, diarrhea, nausea and vomiting.   Endocrine: Negative.    Genitourinary: Negative for decreased urine volume, difficulty urinating and hematuria.   Musculoskeletal: Negative for back pain.   Skin: Positive for wound. Negative for pallor and rash.   Allergic/Immunologic: Negative for immunocompromised state.   Neurological: Positive for weakness. Negative for dizziness, tremors and light-headedness.   Hematological: Negative.    Psychiatric/Behavioral: Negative for agitation, confusion and dysphoric mood. The patient is not nervous/anxious.      Objective:     Vital Signs (Most Recent):  Temp: 98.3 °F (36.8 °C) (08/26/20 1118)  Pulse: 77 (08/26/20 1133)  Resp: 17 (08/26/20 1133)  BP: 101/67 (08/26/20 1133)  SpO2: 98 % (08/26/20 1133) Vital Signs (24h Range):  Temp:  [97.9 °F (36.6 °C)-98.9 °F (37.2 °C)] 98.3 °F (36.8 °C)  Pulse:  [71-80] 77  Resp:  [14-20] 17  SpO2:  [96 %-98 %] 98 %  BP: ()/(50-68) 101/67     Weight: 92 kg (202 lb 13.2 oz)  Body mass index is 32.74 kg/m².    Intake/Output - Last 3 Shifts       08/24 0700 - 08/25 0659 08/25 0700 - 08/26 0659 08/26 0700 - 08/27 0659    P.O.  540     I.V. (mL/kg) 9711 (97.3) 2946 (32)     Blood 5895      Other  0     IV Piggyback  1500      Total Intake(mL/kg) 67737 (156.4) 4986 (54.2)     Urine (mL/kg/hr) 3110 (1.3) 2715 (1.2) 850 (1.3)    Drains 860 400 80    Other 5500      Blood 6000      Total Output 66217 3115 930    Net +136 +1871 -930                 Physical Exam  Vitals signs and nursing note reviewed.   Constitutional:       Appearance: She is well-developed.   HENT:      Head: Normocephalic and atraumatic.   Eyes:      General: No scleral icterus.     Pupils: Pupils are equal, round, and reactive to light.   Neck:      Musculoskeletal: Normal range of motion and neck supple.      Vascular: No JVD.   Cardiovascular:      Rate and Rhythm: Normal rate and regular rhythm.      Heart sounds: Normal heart sounds. No murmur.   Pulmonary:      Effort: Pulmonary effort is normal. No respiratory distress.      Breath sounds: Normal breath sounds. No wheezing.   Abdominal:      General: Bowel sounds are normal. There is no distension.      Palpations: Abdomen is soft.      Comments: Midline inc intact no s/s/i   Musculoskeletal: Normal range of motion.   Skin:     General: Skin is warm and dry.   Neurological:      Mental Status: She is alert and oriented to person, place, and time.   Psychiatric:         Behavior: Behavior normal.         Laboratory:  Immunosuppressants     None        CBC:   Recent Labs   Lab 08/26/20  0617 08/26/20  1233   WBC 9.40  --    RBC 2.49*  --    HGB 7.5* 8.6*   HCT 22.5* 25.5*   PLT 80*  --    MCV 90  --    MCH 30.1  --    MCHC 33.3  --      CMP:   Recent Labs   Lab 08/26/20  0617   GLU 85   CALCIUM 7.7*   ALBUMIN 1.9*   PROT 4.3*      K 3.6   CO2 25      BUN 15   CREATININE 0.7   ALKPHOS 50*   ALT 15   AST 29   BILITOT 0.7     Labs within the past 24 hours have been reviewed.    Diagnostic Results:  I have personally reviewed all pertinent imaging studies.    Debility/Functional status: Patient debilitated by evidence of Weakness. Physical and occupational therapy ordered daily to evaluate and treat. Debility  was: present on admission.    Assessment/Plan:     * Liver mass, right lobe  - s/p massive cystic mass resection 8/24 with large volume blood loss intra op requiring 11 unit PRBCs, 11 units FFP, 1 pk plts.  - pt hypotensive immediately post op and on transfer to TSU.  - Required 500 ml bolus of LR.  - BP improving. Continue IVFs at 50 ml/hr.  - Start midodrine 5 mg tid.  - Tolerating diet, pain controlled with Tylenol/Ibuprofen.  - Encouraged oob, ambulation, working with PT.  - Gerard catheter removed 8/26.  - NATALEE drain in place.  - Monitor.       Edema  - BLE edema, R>L.  - Encouraged to elevated LE in bed/chair.  - Monitor.      Anemia  - H&H decreased this AM. Likely dilutional.  - Repeat improved.  - No need for blood transfusion.  - Monitor.     Constipation  - continue stool regimen.          VTE Risk Mitigation (From admission, onward)         Ordered     heparin (porcine) injection 5,000 Units  Every 8 hours      08/25/20 0920                The patients clinical status was discussed at multidisplinary rounds, involving transplant surgery, transplant medicine, pharmacy, nursing, nutrition, and social work    Discharge Planning: not a candidate for dc at this time.      Alma Morales, WILEY  Liver Transplant  [facility]

## 2020-08-26 NOTE — PLAN OF CARE
AAOx3, afebrile, c/o pain in abd. Scheduled motrin given. Pt did not want scheduled tylenol @0000. Bg monitored achs- no coverage needed. Pt On 2L NC. Drop in bp overnight. 500 cc bolus of LR given with improvement. CBC checked and reviewed. D5 1/2 @ 125 cc/hr. Gerard in place with clear yellow urine. Gerard care performed. Rt myra drain with ss output. Midline incision with staples- painted with betadine. Bowel regimen started. Pt able to position self independently in bed. PT/OT ordered in the morning. Pt in lowest position, side rails up x3, non-skid foot wear in place, call light within reach, pt verbalized understanding to call RN when needed.  Hand hygiene practiced per protocol.  Will continue to monitor.

## 2020-08-26 NOTE — SUBJECTIVE & OBJECTIVE
Scheduled Meds:   acetaminophen  650 mg Oral Q6H    bisacodyL  10 mg Oral QHS    docusate sodium  100 mg Oral TID    heparin (porcine)  5,000 Units Subcutaneous Q8H    ibuprofen  400 mg Oral TID    k phos di & mono-sod phos mono  500 mg Oral BID    midodrine  5 mg Oral TID     Continuous Infusions:   dextrose 5 % and 0.45 % NaCl 50 mL/hr at 08/26/20 1051     PRN Meds:ceFAZolin (ANCEF) IVPB, dextrose 50%, glucagon (human recombinant), insulin aspart U-100, oxyCODONE, oxyCODONE    Review of Systems   Constitutional: Positive for activity change. Negative for appetite change, chills, fatigue and fever.   HENT: Negative for congestion and facial swelling.    Eyes: Negative for pain, discharge and visual disturbance.   Respiratory: Negative for cough, chest tightness, shortness of breath and wheezing.    Cardiovascular: Positive for leg swelling. Negative for chest pain and palpitations.   Gastrointestinal: Positive for abdominal distention and abdominal pain. Negative for constipation, diarrhea, nausea and vomiting.   Endocrine: Negative.    Genitourinary: Negative for decreased urine volume, difficulty urinating and hematuria.   Musculoskeletal: Negative for back pain.   Skin: Positive for wound. Negative for pallor and rash.   Allergic/Immunologic: Negative for immunocompromised state.   Neurological: Positive for weakness. Negative for dizziness, tremors and light-headedness.   Hematological: Negative.    Psychiatric/Behavioral: Negative for agitation, confusion and dysphoric mood. The patient is not nervous/anxious.      Objective:     Vital Signs (Most Recent):  Temp: 98.3 °F (36.8 °C) (08/26/20 1118)  Pulse: 77 (08/26/20 1133)  Resp: 17 (08/26/20 1133)  BP: 101/67 (08/26/20 1133)  SpO2: 98 % (08/26/20 1133) Vital Signs (24h Range):  Temp:  [97.9 °F (36.6 °C)-98.9 °F (37.2 °C)] 98.3 °F (36.8 °C)  Pulse:  [71-80] 77  Resp:  [14-20] 17  SpO2:  [96 %-98 %] 98 %  BP: ()/(50-68) 101/67     Weight: 92 kg  (202 lb 13.2 oz)  Body mass index is 32.74 kg/m².    Intake/Output - Last 3 Shifts       08/24 0700 - 08/25 0659 08/25 0700 - 08/26 0659 08/26 0700 - 08/27 0659    P.O.  540     I.V. (mL/kg) 9711 (97.3) 2946 (32)     Blood 5895      Other  0     IV Piggyback  1500     Total Intake(mL/kg) 25330 (156.4) 4986 (54.2)     Urine (mL/kg/hr) 3110 (1.3) 2715 (1.2) 850 (1.3)    Drains 860 400 80    Other 5500      Blood 6000      Total Output 93956 3115 930    Net +136 +1871 -930                 Physical Exam  Vitals signs and nursing note reviewed.   Constitutional:       Appearance: She is well-developed.   HENT:      Head: Normocephalic and atraumatic.   Eyes:      General: No scleral icterus.     Pupils: Pupils are equal, round, and reactive to light.   Neck:      Musculoskeletal: Normal range of motion and neck supple.      Vascular: No JVD.   Cardiovascular:      Rate and Rhythm: Normal rate and regular rhythm.      Heart sounds: Normal heart sounds. No murmur.   Pulmonary:      Effort: Pulmonary effort is normal. No respiratory distress.      Breath sounds: Normal breath sounds. No wheezing.   Abdominal:      General: Bowel sounds are normal. There is no distension.      Palpations: Abdomen is soft.      Comments: Midline inc intact no s/s/i   Musculoskeletal: Normal range of motion.   Skin:     General: Skin is warm and dry.   Neurological:      Mental Status: She is alert and oriented to person, place, and time.   Psychiatric:         Behavior: Behavior normal.         Laboratory:  Immunosuppressants     None        CBC:   Recent Labs   Lab 08/26/20  0617 08/26/20  1233   WBC 9.40  --    RBC 2.49*  --    HGB 7.5* 8.6*   HCT 22.5* 25.5*   PLT 80*  --    MCV 90  --    MCH 30.1  --    MCHC 33.3  --      CMP:   Recent Labs   Lab 08/26/20  0617   GLU 85   CALCIUM 7.7*   ALBUMIN 1.9*   PROT 4.3*      K 3.6   CO2 25      BUN 15   CREATININE 0.7   ALKPHOS 50*   ALT 15   AST 29   BILITOT 0.7     Labs within the  past 24 hours have been reviewed.    Diagnostic Results:  I have personally reviewed all pertinent imaging studies.    Debility/Functional status: Patient debilitated by evidence of Weakness. Physical and occupational therapy ordered daily to evaluate and treat. Debility was: present on admission.

## 2020-08-27 PROBLEM — E83.39 HYPOPHOSPHATEMIA: Status: ACTIVE | Noted: 2020-08-27

## 2020-08-27 LAB
ALBUMIN SERPL BCP-MCNC: 1.6 G/DL (ref 3.5–5.2)
ALP SERPL-CCNC: 63 U/L (ref 55–135)
ALT SERPL W/O P-5'-P-CCNC: 14 U/L (ref 10–44)
ANION GAP SERPL CALC-SCNC: 5 MMOL/L (ref 8–16)
AST SERPL-CCNC: 20 U/L (ref 10–40)
BASOPHILS # BLD AUTO: 0.03 K/UL (ref 0–0.2)
BASOPHILS NFR BLD: 0.4 % (ref 0–1.9)
BILIRUB SERPL-MCNC: 0.7 MG/DL (ref 0.1–1)
BLD PROD TYP BPU: NORMAL
BLOOD UNIT EXPIRATION DATE: NORMAL
BLOOD UNIT TYPE CODE: 600
BLOOD UNIT TYPE CODE: 6200
BLOOD UNIT TYPE: NORMAL
BUN SERPL-MCNC: 12 MG/DL (ref 8–23)
CALCIUM SERPL-MCNC: 7.3 MG/DL (ref 8.7–10.5)
CHLORIDE SERPL-SCNC: 111 MMOL/L (ref 95–110)
CO2 SERPL-SCNC: 27 MMOL/L (ref 23–29)
CODING SYSTEM: NORMAL
CREAT SERPL-MCNC: 0.6 MG/DL (ref 0.5–1.4)
DIFFERENTIAL METHOD: ABNORMAL
DISPENSE STATUS: NORMAL
EOSINOPHIL # BLD AUTO: 0.4 K/UL (ref 0–0.5)
EOSINOPHIL NFR BLD: 4.2 % (ref 0–8)
ERYTHROCYTE [DISTWIDTH] IN BLOOD BY AUTOMATED COUNT: 17.6 % (ref 11.5–14.5)
EST. GFR  (AFRICAN AMERICAN): >60 ML/MIN/1.73 M^2
EST. GFR  (NON AFRICAN AMERICAN): >60 ML/MIN/1.73 M^2
GLUCOSE SERPL-MCNC: 85 MG/DL (ref 70–110)
HCT VFR BLD AUTO: 24.6 % (ref 37–48.5)
HGB BLD-MCNC: 7.8 G/DL (ref 12–16)
IMM GRANULOCYTES # BLD AUTO: 0.08 K/UL (ref 0–0.04)
IMM GRANULOCYTES NFR BLD AUTO: 1 % (ref 0–0.5)
LYMPHOCYTES # BLD AUTO: 1.3 K/UL (ref 1–4.8)
LYMPHOCYTES NFR BLD: 15 % (ref 18–48)
MCH RBC QN AUTO: 29.7 PG (ref 27–31)
MCHC RBC AUTO-ENTMCNC: 31.7 G/DL (ref 32–36)
MCV RBC AUTO: 94 FL (ref 82–98)
MONOCYTES # BLD AUTO: 0.4 K/UL (ref 0.3–1)
MONOCYTES NFR BLD: 4.2 % (ref 4–15)
NEUTROPHILS # BLD AUTO: 6.3 K/UL (ref 1.8–7.7)
NEUTROPHILS NFR BLD: 75.2 % (ref 38–73)
NRBC BLD-RTO: 0 /100 WBC
NUM UNITS TRANS FFP: NORMAL
PHOSPHATE SERPL-MCNC: 4.1 MG/DL (ref 2.7–4.5)
PLATELET # BLD AUTO: 94 K/UL (ref 150–350)
PMV BLD AUTO: 11.2 FL (ref 9.2–12.9)
POCT GLUCOSE: 83 MG/DL (ref 70–110)
POCT GLUCOSE: 90 MG/DL (ref 70–110)
POCT GLUCOSE: 91 MG/DL (ref 70–110)
POCT GLUCOSE: 92 MG/DL (ref 70–110)
POTASSIUM SERPL-SCNC: 3.7 MMOL/L (ref 3.5–5.1)
PROT SERPL-MCNC: 4.2 G/DL (ref 6–8.4)
RBC # BLD AUTO: 2.63 M/UL (ref 4–5.4)
SODIUM SERPL-SCNC: 143 MMOL/L (ref 136–145)
WBC # BLD AUTO: 8.39 K/UL (ref 3.9–12.7)

## 2020-08-27 PROCEDURE — 63600175 PHARM REV CODE 636 W HCPCS: Performed by: STUDENT IN AN ORGANIZED HEALTH CARE EDUCATION/TRAINING PROGRAM

## 2020-08-27 PROCEDURE — 80053 COMPREHEN METABOLIC PANEL: CPT

## 2020-08-27 PROCEDURE — 25000003 PHARM REV CODE 250: Performed by: NURSE PRACTITIONER

## 2020-08-27 PROCEDURE — 25000003 PHARM REV CODE 250: Performed by: PHYSICIAN ASSISTANT

## 2020-08-27 PROCEDURE — 63600175 PHARM REV CODE 636 W HCPCS: Mod: JG | Performed by: NURSE PRACTITIONER

## 2020-08-27 PROCEDURE — 99233 PR SUBSEQUENT HOSPITAL CARE,LEVL III: ICD-10-PCS | Mod: ,,, | Performed by: NURSE PRACTITIONER

## 2020-08-27 PROCEDURE — 25000003 PHARM REV CODE 250: Performed by: TRANSPLANT SURGERY

## 2020-08-27 PROCEDURE — 36415 COLL VENOUS BLD VENIPUNCTURE: CPT

## 2020-08-27 PROCEDURE — 99233 SBSQ HOSP IP/OBS HIGH 50: CPT | Mod: ,,, | Performed by: NURSE PRACTITIONER

## 2020-08-27 PROCEDURE — S5010 5% DEXTROSE AND 0.45% SALINE: HCPCS | Performed by: NURSE PRACTITIONER

## 2020-08-27 PROCEDURE — 20600001 HC STEP DOWN PRIVATE ROOM

## 2020-08-27 PROCEDURE — 85025 COMPLETE CBC W/AUTO DIFF WBC: CPT

## 2020-08-27 PROCEDURE — P9047 ALBUMIN (HUMAN), 25%, 50ML: HCPCS | Mod: JG | Performed by: NURSE PRACTITIONER

## 2020-08-27 PROCEDURE — 84100 ASSAY OF PHOSPHORUS: CPT

## 2020-08-27 RX ORDER — LIDOCAINE HYDROCHLORIDE 10 MG/ML
10 INJECTION INFILTRATION; PERINEURAL ONCE
Status: COMPLETED | OUTPATIENT
Start: 2020-08-27 | End: 2020-08-27

## 2020-08-27 RX ORDER — BISACODYL 10 MG
10 SUPPOSITORY, RECTAL RECTAL DAILY PRN
Status: DISCONTINUED | OUTPATIENT
Start: 2020-08-27 | End: 2020-08-31 | Stop reason: HOSPADM

## 2020-08-27 RX ORDER — FUROSEMIDE 10 MG/ML
20 INJECTION INTRAMUSCULAR; INTRAVENOUS ONCE
Status: COMPLETED | OUTPATIENT
Start: 2020-08-27 | End: 2020-08-27

## 2020-08-27 RX ORDER — ALBUMIN HUMAN 250 G/1000ML
25 SOLUTION INTRAVENOUS EVERY 6 HOURS
Status: COMPLETED | OUTPATIENT
Start: 2020-08-27 | End: 2020-08-27

## 2020-08-27 RX ORDER — POLYETHYLENE GLYCOL 3350 17 G/17G
17 POWDER, FOR SOLUTION ORAL DAILY
Status: DISCONTINUED | OUTPATIENT
Start: 2020-08-27 | End: 2020-08-31 | Stop reason: HOSPADM

## 2020-08-27 RX ADMIN — DEXTROSE AND SODIUM CHLORIDE: 5; .45 INJECTION, SOLUTION INTRAVENOUS at 03:08

## 2020-08-27 RX ADMIN — IBUPROFEN 400 MG: 400 TABLET, FILM COATED ORAL at 08:08

## 2020-08-27 RX ADMIN — DIBASIC SODIUM PHOSPHATE, MONOBASIC POTASSIUM PHOSPHATE AND MONOBASIC SODIUM PHOSPHATE 2 TABLET: 852; 155; 130 TABLET ORAL at 08:08

## 2020-08-27 RX ADMIN — DOCUSATE SODIUM 100 MG: 100 CAPSULE, LIQUID FILLED ORAL at 08:08

## 2020-08-27 RX ADMIN — ALBUMIN (HUMAN) 25 G: 12.5 SOLUTION INTRAVENOUS at 12:08

## 2020-08-27 RX ADMIN — ACETAMINOPHEN 650 MG: 325 TABLET ORAL at 11:08

## 2020-08-27 RX ADMIN — ACETAMINOPHEN 650 MG: 325 TABLET ORAL at 05:08

## 2020-08-27 RX ADMIN — POLYETHYLENE GLYCOL 3350 17 G: 17 POWDER, FOR SOLUTION ORAL at 11:08

## 2020-08-27 RX ADMIN — MIDODRINE HYDROCHLORIDE 5 MG: 5 TABLET ORAL at 08:08

## 2020-08-27 RX ADMIN — DIBASIC SODIUM PHOSPHATE, MONOBASIC POTASSIUM PHOSPHATE AND MONOBASIC SODIUM PHOSPHATE 1 TABLET: 852; 155; 130 TABLET ORAL at 08:08

## 2020-08-27 RX ADMIN — LIDOCAINE HYDROCHLORIDE 10 ML: 10 INJECTION, SOLUTION INFILTRATION; PERINEURAL at 02:08

## 2020-08-27 RX ADMIN — HEPARIN SODIUM 5000 UNITS: 5000 INJECTION INTRAVENOUS; SUBCUTANEOUS at 02:08

## 2020-08-27 RX ADMIN — IBUPROFEN 400 MG: 400 TABLET, FILM COATED ORAL at 02:08

## 2020-08-27 RX ADMIN — DOCUSATE SODIUM 100 MG: 100 CAPSULE, LIQUID FILLED ORAL at 02:08

## 2020-08-27 RX ADMIN — FUROSEMIDE 20 MG: 10 INJECTION, SOLUTION INTRAMUSCULAR; INTRAVENOUS at 12:08

## 2020-08-27 RX ADMIN — BISACODYL 10 MG: 5 TABLET, COATED ORAL at 08:08

## 2020-08-27 RX ADMIN — ALBUMIN (HUMAN) 25 G: 12.5 SOLUTION INTRAVENOUS at 11:08

## 2020-08-27 RX ADMIN — ALBUMIN (HUMAN) 25 G: 12.5 SOLUTION INTRAVENOUS at 05:08

## 2020-08-27 RX ADMIN — HEPARIN SODIUM 5000 UNITS: 5000 INJECTION INTRAVENOUS; SUBCUTANEOUS at 05:08

## 2020-08-27 RX ADMIN — MIDODRINE HYDROCHLORIDE 5 MG: 5 TABLET ORAL at 02:08

## 2020-08-27 RX ADMIN — HEPARIN SODIUM 5000 UNITS: 5000 INJECTION INTRAVENOUS; SUBCUTANEOUS at 08:08

## 2020-08-27 NOTE — PLAN OF CARE
AAOx3, afebrile, c/o pain. Scheduled pain medication given. Bg monitored achs. Pt started pn midodrine started yesterday. Pt placed on 2L while sleeping. D51/2 @50cc/hr. Plan to remove hernandez this morning. Elbert on rt side- ss output. Mdline incision with staples- painted with betadine. Bowel regimen started. Bowel sounds active and pt passing gas. No bm thus far. Pt able to position self independently in bed. Pt refusing wedge. PT/OT ordered. Pt in lowest position, side rails up x2, non-skid foot wear in place, call light within reach, pt verbalized understanding to call RN when needed.  Hand hygiene practiced per protocol.  Will continue to monitor.

## 2020-08-27 NOTE — ASSESSMENT & PLAN NOTE
- BLE edema, R>L.  - Encouraged to elevated LE in bed/chair.  - Albumin and lasix ordered.  - Monitor.

## 2020-08-27 NOTE — SUBJECTIVE & OBJECTIVE
Scheduled Meds:   acetaminophen  650 mg Oral Q6H    albumin human 25%  25 g Intravenous Q6H    bisacodyL  10 mg Oral QHS    docusate sodium  100 mg Oral TID    heparin (porcine)  5,000 Units Subcutaneous Q8H    ibuprofen  400 mg Oral TID    k phos di & mono-sod phos mono  250 mg Oral BID    lidocaine HCL 10 mg/ml (1%)  10 mL Intradermal Once    midodrine  5 mg Oral TID    polyethylene glycol  17 g Oral Daily     Continuous Infusions:  PRN Meds:bisacodyL, ceFAZolin (ANCEF) IVPB, dextrose 50%, glucagon (human recombinant), insulin aspart U-100, oxyCODONE, oxyCODONE    Review of Systems   Constitutional: Positive for activity change. Negative for appetite change, chills, fatigue and fever.   HENT: Negative for congestion and facial swelling.    Eyes: Negative for pain, discharge and visual disturbance.   Respiratory: Negative for cough, chest tightness, shortness of breath and wheezing.    Cardiovascular: Positive for leg swelling. Negative for chest pain and palpitations.   Gastrointestinal: Positive for abdominal distention and abdominal pain. Negative for constipation, diarrhea, nausea and vomiting.   Endocrine: Negative.    Genitourinary: Negative for decreased urine volume, difficulty urinating and hematuria.   Musculoskeletal: Negative for back pain.   Skin: Positive for wound. Negative for pallor and rash.   Allergic/Immunologic: Negative for immunocompromised state.   Neurological: Positive for weakness. Negative for dizziness, tremors and light-headedness.   Hematological: Negative.    Psychiatric/Behavioral: Negative for agitation, confusion and dysphoric mood. The patient is not nervous/anxious.      Objective:     Vital Signs (Most Recent):  Temp: 98.3 °F (36.8 °C) (08/27/20 1210)  Pulse: 85 (08/27/20 1217)  Resp: 19 (08/27/20 1217)  BP: 107/62 (08/27/20 1217)  SpO2: (!) 92 % (08/27/20 1217) Vital Signs (24h Range):  Temp:  [98 °F (36.7 °C)-98.4 °F (36.9 °C)] 98.3 °F (36.8 °C)  Pulse:  [70-87]  85  Resp:  [12-22] 19  SpO2:  [92 %-96 %] 92 %  BP: ()/(51-66) 107/62     Weight: 89.5 kg (197 lb 5 oz)  Body mass index is 31.85 kg/m².    Intake/Output - Last 3 Shifts       08/25 0700 - 08/26 0659 08/26 0700 - 08/27 0659 08/27 0700 - 08/28 0659    P.O. 540 1080 480    I.V. (mL/kg) 2946 (32) 1705.4 (19.1)     Blood       Other 0 0     IV Piggyback 1500      Total Intake(mL/kg) 4986 (54.2) 2785.4 (31.1) 480 (5.4)    Urine (mL/kg/hr) 2715 (1.2) 2650 (1.2)     Emesis/NG output  0     Drains 400 310 130    Other  0     Stool  0     Blood  0     Total Output 3115 2960 130    Net +1871 -174.6 +350           Urine Occurrence  0 x     Stool Occurrence  0 x     Emesis Occurrence  0 x           Physical Exam  Vitals signs and nursing note reviewed.   Constitutional:       Appearance: She is well-developed.   HENT:      Head: Normocephalic and atraumatic.   Eyes:      General: No scleral icterus.     Pupils: Pupils are equal, round, and reactive to light.   Neck:      Musculoskeletal: Normal range of motion and neck supple.      Vascular: No JVD.   Cardiovascular:      Rate and Rhythm: Normal rate and regular rhythm.      Heart sounds: Normal heart sounds. No murmur.   Pulmonary:      Effort: Pulmonary effort is normal. No respiratory distress.      Breath sounds: Normal breath sounds. No wheezing.   Abdominal:      General: Bowel sounds are normal. There is no distension.      Palpations: Abdomen is soft.      Comments: Midline inc with staples intact no s/s/i   Musculoskeletal: Normal range of motion.   Skin:     General: Skin is warm and dry.   Neurological:      Mental Status: She is alert and oriented to person, place, and time.   Psychiatric:         Behavior: Behavior normal.         Laboratory:  Immunosuppressants     None        CBC:   Recent Labs   Lab 08/27/20  0631   WBC 8.39   RBC 2.63*   HGB 7.8*   HCT 24.6*   PLT 94*   MCV 94   MCH 29.7   MCHC 31.7*     CMP:   Recent Labs   Lab 08/27/20  0631   GLU 85    CALCIUM 7.3*   ALBUMIN 1.6*   PROT 4.2*      K 3.7   CO2 27   *   BUN 12   CREATININE 0.6   ALKPHOS 63   ALT 14   AST 20   BILITOT 0.7     Labs within the past 24 hours have been reviewed.    Diagnostic Results:  I have personally reviewed all pertinent imaging studies.    Debility/Functional status: Patient debilitated by evidence of Weakness. Physical and occupational therapy ordered daily to evaluate and treat. Debility was: present on admission.

## 2020-08-27 NOTE — PLAN OF CARE
AAOx4. VSS. Afebrile.  Scheduled tylenol/ibuprofen given for abd pain.  Liver resection done 8/24. Midline incision NATALIE w staples.  RLQ NATALEE site w gauze CDI.  Midodrine started for hypotension yesterday.  Purewick in place. Clear yellow urine.  No BM. Passing gas. Bowel regimen continued.  MIVF discontinued.  Albumin and IVP lasix given per orders.  Diab diet. ACHS. No SSI needed.  OOBTC w assist. Call bell in reach. WCTM.

## 2020-08-27 NOTE — NURSING
Hernandez removed @0550 per order. Purwick in place. Will continue to monitor for first void after hernandez.

## 2020-08-27 NOTE — PHYSICIAN QUERY
PT Name: Magaly Hutchinson  MR #: [id number]    HEMATOLOGY CLARIFICATION      CDS/: Laurence Jin               Contact information: [email or web address]     This form is a permanent document in the medical record.      Query Date: August 27, 2020    By submitting this query, we are merely seeking further clarification of documentation. Please utilize your independent clinical judgment when addressing the question(s) below.    The Medical Record contains the following:   Indicators  Supporting Clinical Findings Location in Medical Record    Anemia documented     X H&H Hemoglobin 4.8- 8.6  Hematocrit 15.6- 25.5   Lab 8/24- 8/26    BP                    HR      GI bleeding documented     X Acute bleeding (Non GI site) Significant blood loss intraop with administration of 11RBC, 11FFP, 2Plt, 2 Cryo;     Estimated Blood Loss: 2000 mL   Liver Transplant PN 8/26      Op Note 8/24   X Transfusion(s) 11 units PRBC    Transfusion Record     Acute/Chronic illness      Treatments      Other       Provider, please specify diagnosis or diagnoses associated with above clinical findings.   [  x ] Acute blood loss anemia    [   ] Acute blood loss anemia expected post-operatively    [   ] Other Hematological Diagnosis (please specify): _________________   [   ] Clinically Undetermined     Present on admission (POA) status:   [   ] Yes (Y)                          [  ] Clinically Undetermined (W)  [ x  ] No (N)                            [   ] Documentation insufficient to determine if condition is POA (U)          Please document in your progress notes daily for the duration of treatment, until resolved, and include in your discharge summary.

## 2020-08-27 NOTE — PROGRESS NOTES
[facility]  Liver Transplant  Progress Note    Patient Name: Magaly Hutchinson  MRN: [id number]  Admission Date: 8/24/2020  Hospital Length of Stay: 3 days  Code Status: No Order  Primary Care Provider: Primary Doctor No  Post-Operative Day:     ORGAN:     Disease Etiology:   Donor Type:     CDC High Risk:     Donor CMV Status:   Donor CMV Status:   Donor HBcAB:     Donor HCV Status:     Donor HBV BENJAMIN: Organ record is missing.  Donor HCV BENJAMIN: Organ record is missing.  Whole or Partial:   Biliary Anastomosis:   Arterial Anatomy:   Subjective:     History of Present Illness:  Ms. Magaly Hutchinson is a 84 year old female with hx of abdominal distention over the past several months which she attributed to weight gain. 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 She has also noted increased edema to her lower extremities, right greater than left.     Hospital Course:  Pt is s/p massive cystic mass resection on 8/24/20. Pt with large right retroperitoneum mass excision with significant blood loss intra op and administration of 11 units PRBCs, 11 units FFP, 2 packs of platelets, and 2 CRYO. Post op pt hypotensive on PACU and upon transfer to TSU. Required 500 ml bolus of LR overnight. BP improved slightly since bolus and starting Midodrine.    Interval history: No acute events overnight. Pt feeling better. Pain controlled. Working well with therapy. Gerard catheter removed and pt voiding well via pure wick. IVFs dc'd. Diurese with lasix IV. Albumin ordered x 3 doses. Pt tolerating diet. Denies nausea/vomiting. No BM yet, passing flatus. Continue stool regimen. Monitor.     Scheduled Meds:   acetaminophen  650 mg Oral Q6H    albumin human 25%  25 g Intravenous Q6H    bisacodyL  10 mg Oral QHS    docusate sodium  100 mg Oral TID    heparin (porcine)  5,000 Units Subcutaneous Q8H     ibuprofen  400 mg Oral TID    k phos di & mono-sod phos mono  250 mg Oral BID    lidocaine HCL 10 mg/ml (1%)  10 mL Intradermal Once    midodrine  5 mg Oral TID    polyethylene glycol  17 g Oral Daily     Continuous Infusions:  PRN Meds:bisacodyL, ceFAZolin (ANCEF) IVPB, dextrose 50%, glucagon (human recombinant), insulin aspart U-100, oxyCODONE, oxyCODONE    Review of Systems   Constitutional: Positive for activity change. Negative for appetite change, chills, fatigue and fever.   HENT: Negative for congestion and facial swelling.    Eyes: Negative for pain, discharge and visual disturbance.   Respiratory: Negative for cough, chest tightness, shortness of breath and wheezing.    Cardiovascular: Positive for leg swelling. Negative for chest pain and palpitations.   Gastrointestinal: Positive for abdominal distention and abdominal pain. Negative for constipation, diarrhea, nausea and vomiting.   Endocrine: Negative.    Genitourinary: Negative for decreased urine volume, difficulty urinating and hematuria.   Musculoskeletal: Negative for back pain.   Skin: Positive for wound. Negative for pallor and rash.   Allergic/Immunologic: Negative for immunocompromised state.   Neurological: Positive for weakness. Negative for dizziness, tremors and light-headedness.   Hematological: Negative.    Psychiatric/Behavioral: Negative for agitation, confusion and dysphoric mood. The patient is not nervous/anxious.      Objective:     Vital Signs (Most Recent):  Temp: 98.3 °F (36.8 °C) (08/27/20 1210)  Pulse: 85 (08/27/20 1217)  Resp: 19 (08/27/20 1217)  BP: 107/62 (08/27/20 1217)  SpO2: (!) 92 % (08/27/20 1217) Vital Signs (24h Range):  Temp:  [98 °F (36.7 °C)-98.4 °F (36.9 °C)] 98.3 °F (36.8 °C)  Pulse:  [70-87] 85  Resp:  [12-22] 19  SpO2:  [92 %-96 %] 92 %  BP: ()/(51-66) 107/62     Weight: 89.5 kg (197 lb 5 oz)  Body mass index is 31.85 kg/m².    Intake/Output - Last 3 Shifts       08/25 0700 - 08/26 0659 08/26 0700  - 08/27 0659 08/27 0700 - 08/28 0659    P.O. 540 1080 480    I.V. (mL/kg) 2946 (32) 1705.4 (19.1)     Blood       Other 0 0     IV Piggyback 1500      Total Intake(mL/kg) 4986 (54.2) 2785.4 (31.1) 480 (5.4)    Urine (mL/kg/hr) 2715 (1.2) 2650 (1.2)     Emesis/NG output  0     Drains 400 310 130    Other  0     Stool  0     Blood  0     Total Output 3115 2960 130    Net +1871 -174.6 +350           Urine Occurrence  0 x     Stool Occurrence  0 x     Emesis Occurrence  0 x           Physical Exam  Vitals signs and nursing note reviewed.   Constitutional:       Appearance: She is well-developed.   HENT:      Head: Normocephalic and atraumatic.   Eyes:      General: No scleral icterus.     Pupils: Pupils are equal, round, and reactive to light.   Neck:      Musculoskeletal: Normal range of motion and neck supple.      Vascular: No JVD.   Cardiovascular:      Rate and Rhythm: Normal rate and regular rhythm.      Heart sounds: Normal heart sounds. No murmur.   Pulmonary:      Effort: Pulmonary effort is normal. No respiratory distress.      Breath sounds: Normal breath sounds. No wheezing.   Abdominal:      General: Bowel sounds are normal. There is no distension.      Palpations: Abdomen is soft.      Comments: Midline inc with staples intact no s/s/i   Musculoskeletal: Normal range of motion.   Skin:     General: Skin is warm and dry.   Neurological:      Mental Status: She is alert and oriented to person, place, and time.   Psychiatric:         Behavior: Behavior normal.         Laboratory:  Immunosuppressants     None        CBC:   Recent Labs   Lab 08/27/20  0631   WBC 8.39   RBC 2.63*   HGB 7.8*   HCT 24.6*   PLT 94*   MCV 94   MCH 29.7   MCHC 31.7*     CMP:   Recent Labs   Lab 08/27/20  0631   GLU 85   CALCIUM 7.3*   ALBUMIN 1.6*   PROT 4.2*      K 3.7   CO2 27   *   BUN 12   CREATININE 0.6   ALKPHOS 63   ALT 14   AST 20   BILITOT 0.7     Labs within the past 24 hours have been reviewed.    Diagnostic  Results:  I have personally reviewed all pertinent imaging studies.    Debility/Functional status: Patient debilitated by evidence of Weakness. Physical and occupational therapy ordered daily to evaluate and treat. Debility was: present on admission.    Assessment/Plan:     * Liver mass, right lobe  - s/p massive cystic mass resection 8/24 with large volume blood loss intra op requiring 11 unit PRBCs, 11 units FFP, 1 pk plts.  - pt hypotensive immediately post op and on transfer to TSU.  - Required 500 ml bolus of LR.  - BP improving.   - Continue Midodrine 5 mg tid.  - Tolerating diet, pain controlled with Tylenol/Ibuprofen.  - Encouraged oob, ambulation, working with PT.  - Gerard catheter removed 8/27.  - dc NATALEE 8/27.  - Diurese with lasix 20 mg IV. Albumin ordered x 3.  - Monitor.       Edema  - BLE edema, R>L.  - Encouraged to elevated LE in bed/chair.  - Albumin and lasix ordered.  - Monitor.      Anemia  - H&H decreased but stable.  - Type and screen in AM.  - Monitor.     Constipation  - continue stool regimen.      Hypophosphatemia  - Decrease KPN to 250 mg bid.          VTE Risk Mitigation (From admission, onward)         Ordered     heparin (porcine) injection 5,000 Units  Every 8 hours      08/25/20 0920              NATALEE drain d/c'd. Site cleaned with alcohol. 1% lidocaine applied. 3-0 suture to site. Drain removed intact and without difficulty. Pt tolerated well.       The patients clinical status was discussed at multidisplinary rounds, involving transplant surgery, transplant medicine, pharmacy, nursing, nutrition, and social work    Discharge Planning: not a candidate for dc at this time.       Alma Morales NP  Liver Transplant  [facility]

## 2020-08-28 LAB
ABO + RH BLD: NORMAL
ALBUMIN SERPL BCP-MCNC: 2.7 G/DL (ref 3.5–5.2)
ALP SERPL-CCNC: 65 U/L (ref 55–135)
ALT SERPL W/O P-5'-P-CCNC: 9 U/L (ref 10–44)
ANION GAP SERPL CALC-SCNC: 6 MMOL/L (ref 8–16)
AST SERPL-CCNC: 14 U/L (ref 10–40)
BASOPHILS # BLD AUTO: 0.02 K/UL (ref 0–0.2)
BASOPHILS NFR BLD: 0.3 % (ref 0–1.9)
BILIRUB SERPL-MCNC: 1.2 MG/DL (ref 0.1–1)
BLD GP AB SCN CELLS X3 SERPL QL: NORMAL
BLD PROD TYP BPU: NORMAL
BLOOD UNIT EXPIRATION DATE: NORMAL
BLOOD UNIT TYPE CODE: 6200
BLOOD UNIT TYPE: NORMAL
BUN SERPL-MCNC: 11 MG/DL (ref 8–23)
CALCIUM SERPL-MCNC: 8 MG/DL (ref 8.7–10.5)
CHLORIDE SERPL-SCNC: 108 MMOL/L (ref 95–110)
CO2 SERPL-SCNC: 30 MMOL/L (ref 23–29)
CODING SYSTEM: NORMAL
CREAT SERPL-MCNC: 0.6 MG/DL (ref 0.5–1.4)
DIFFERENTIAL METHOD: ABNORMAL
DISPENSE STATUS: NORMAL
EOSINOPHIL # BLD AUTO: 0.3 K/UL (ref 0–0.5)
EOSINOPHIL NFR BLD: 4.8 % (ref 0–8)
ERYTHROCYTE [DISTWIDTH] IN BLOOD BY AUTOMATED COUNT: 17.9 % (ref 11.5–14.5)
EST. GFR  (AFRICAN AMERICAN): >60 ML/MIN/1.73 M^2
EST. GFR  (NON AFRICAN AMERICAN): >60 ML/MIN/1.73 M^2
GLUCOSE SERPL-MCNC: 91 MG/DL (ref 70–110)
HCT VFR BLD AUTO: 26.1 % (ref 37–48.5)
HGB BLD-MCNC: 7.9 G/DL (ref 12–16)
IMM GRANULOCYTES # BLD AUTO: 0.06 K/UL (ref 0–0.04)
IMM GRANULOCYTES NFR BLD AUTO: 1 % (ref 0–0.5)
LYMPHOCYTES # BLD AUTO: 0.9 K/UL (ref 1–4.8)
LYMPHOCYTES NFR BLD: 14.9 % (ref 18–48)
MCH RBC QN AUTO: 28.9 PG (ref 27–31)
MCHC RBC AUTO-ENTMCNC: 30.3 G/DL (ref 32–36)
MCV RBC AUTO: 96 FL (ref 82–98)
MONOCYTES # BLD AUTO: 0.3 K/UL (ref 0.3–1)
MONOCYTES NFR BLD: 5.3 % (ref 4–15)
NEUTROPHILS # BLD AUTO: 4.4 K/UL (ref 1.8–7.7)
NEUTROPHILS NFR BLD: 73.7 % (ref 38–73)
NRBC BLD-RTO: 0 /100 WBC
PHOSPHATE SERPL-MCNC: 3.8 MG/DL (ref 2.7–4.5)
PLATELET # BLD AUTO: 118 K/UL (ref 150–350)
PMV BLD AUTO: 11.1 FL (ref 9.2–12.9)
POCT GLUCOSE: 100 MG/DL (ref 70–110)
POCT GLUCOSE: 107 MG/DL (ref 70–110)
POCT GLUCOSE: 89 MG/DL (ref 70–110)
POTASSIUM SERPL-SCNC: 3.4 MMOL/L (ref 3.5–5.1)
PROT SERPL-MCNC: 5.2 G/DL (ref 6–8.4)
RBC # BLD AUTO: 2.73 M/UL (ref 4–5.4)
SODIUM SERPL-SCNC: 144 MMOL/L (ref 136–145)
TRANS ERYTHROCYTES VOL PATIENT: NORMAL ML
WBC # BLD AUTO: 6.03 K/UL (ref 3.9–12.7)

## 2020-08-28 PROCEDURE — 80053 COMPREHEN METABOLIC PANEL: CPT

## 2020-08-28 PROCEDURE — 97116 GAIT TRAINING THERAPY: CPT

## 2020-08-28 PROCEDURE — 97530 THERAPEUTIC ACTIVITIES: CPT

## 2020-08-28 PROCEDURE — 99233 SBSQ HOSP IP/OBS HIGH 50: CPT | Mod: ,,, | Performed by: NURSE PRACTITIONER

## 2020-08-28 PROCEDURE — 36415 COLL VENOUS BLD VENIPUNCTURE: CPT

## 2020-08-28 PROCEDURE — 97110 THERAPEUTIC EXERCISES: CPT

## 2020-08-28 PROCEDURE — 99233 PR SUBSEQUENT HOSPITAL CARE,LEVL III: ICD-10-PCS | Mod: ,,, | Performed by: NURSE PRACTITIONER

## 2020-08-28 PROCEDURE — 20600001 HC STEP DOWN PRIVATE ROOM

## 2020-08-28 PROCEDURE — 63600175 PHARM REV CODE 636 W HCPCS: Performed by: NURSE PRACTITIONER

## 2020-08-28 PROCEDURE — 25000003 PHARM REV CODE 250: Performed by: NURSE PRACTITIONER

## 2020-08-28 PROCEDURE — 84100 ASSAY OF PHOSPHORUS: CPT

## 2020-08-28 PROCEDURE — 25000003 PHARM REV CODE 250: Performed by: PHYSICIAN ASSISTANT

## 2020-08-28 PROCEDURE — 25000003 PHARM REV CODE 250: Performed by: TRANSPLANT SURGERY

## 2020-08-28 PROCEDURE — 85025 COMPLETE CBC W/AUTO DIFF WBC: CPT

## 2020-08-28 PROCEDURE — 86850 RBC ANTIBODY SCREEN: CPT

## 2020-08-28 PROCEDURE — 63600175 PHARM REV CODE 636 W HCPCS: Performed by: STUDENT IN AN ORGANIZED HEALTH CARE EDUCATION/TRAINING PROGRAM

## 2020-08-28 RX ORDER — POTASSIUM CHLORIDE 20 MEQ/1
40 TABLET, EXTENDED RELEASE ORAL ONCE
Status: COMPLETED | OUTPATIENT
Start: 2020-08-28 | End: 2020-08-28

## 2020-08-28 RX ADMIN — DOCUSATE SODIUM 100 MG: 100 CAPSULE, LIQUID FILLED ORAL at 09:08

## 2020-08-28 RX ADMIN — DIBASIC SODIUM PHOSPHATE, MONOBASIC POTASSIUM PHOSPHATE AND MONOBASIC SODIUM PHOSPHATE 1 TABLET: 852; 155; 130 TABLET ORAL at 07:08

## 2020-08-28 RX ADMIN — DIBASIC SODIUM PHOSPHATE, MONOBASIC POTASSIUM PHOSPHATE AND MONOBASIC SODIUM PHOSPHATE 1 TABLET: 852; 155; 130 TABLET ORAL at 09:08

## 2020-08-28 RX ADMIN — DOCUSATE SODIUM 100 MG: 100 CAPSULE, LIQUID FILLED ORAL at 02:08

## 2020-08-28 RX ADMIN — ACETAZOLAMIDE SODIUM 250 MG: 500 INJECTION, POWDER, LYOPHILIZED, FOR SOLUTION INTRAVENOUS at 12:08

## 2020-08-28 RX ADMIN — MIDODRINE HYDROCHLORIDE 5 MG: 5 TABLET ORAL at 02:08

## 2020-08-28 RX ADMIN — BISACODYL 10 MG: 10 SUPPOSITORY RECTAL at 04:08

## 2020-08-28 RX ADMIN — HEPARIN SODIUM 5000 UNITS: 5000 INJECTION INTRAVENOUS; SUBCUTANEOUS at 05:08

## 2020-08-28 RX ADMIN — HEPARIN SODIUM 5000 UNITS: 5000 INJECTION INTRAVENOUS; SUBCUTANEOUS at 09:08

## 2020-08-28 RX ADMIN — DOCUSATE SODIUM 100 MG: 100 CAPSULE, LIQUID FILLED ORAL at 07:08

## 2020-08-28 RX ADMIN — MIDODRINE HYDROCHLORIDE 5 MG: 5 TABLET ORAL at 07:08

## 2020-08-28 RX ADMIN — IBUPROFEN 400 MG: 400 TABLET, FILM COATED ORAL at 07:08

## 2020-08-28 RX ADMIN — BISACODYL 10 MG: 5 TABLET, COATED ORAL at 09:08

## 2020-08-28 RX ADMIN — POTASSIUM CHLORIDE 40 MEQ: 1500 TABLET, EXTENDED RELEASE ORAL at 12:08

## 2020-08-28 RX ADMIN — IBUPROFEN 400 MG: 400 TABLET, FILM COATED ORAL at 02:08

## 2020-08-28 RX ADMIN — MIDODRINE HYDROCHLORIDE 5 MG: 5 TABLET ORAL at 09:08

## 2020-08-28 RX ADMIN — HEPARIN SODIUM 5000 UNITS: 5000 INJECTION INTRAVENOUS; SUBCUTANEOUS at 02:08

## 2020-08-28 RX ADMIN — ACETAMINOPHEN 650 MG: 325 TABLET ORAL at 05:08

## 2020-08-28 RX ADMIN — POLYETHYLENE GLYCOL 3350 17 G: 17 POWDER, FOR SOLUTION ORAL at 07:08

## 2020-08-28 NOTE — PLAN OF CARE
AAOx3, afebrile, c/o pain. Scheduled tylenol and motrin given. Bg monitored achs- no coverage needed.  Type and screen ordered for this morning. Pt bowel sounds are active. Plan for suppository to be given this morning. Abd larger than prior nights- team aware. Pt needing 1-2 L NC while sleeping. IV albumin given. Midline incision with staples- painted with betadine. Purwick in place. Pt refuses waffle mattress and wedge. States she can turn herself. Pt in lowest position, side rails up x3, non-skid foot wear in place, call light within reach, pt verbalized understanding to call RN when needed.  Hand hygiene practiced per protocol.  Will continue to monitor.

## 2020-08-28 NOTE — PLAN OF CARE
Problem: Physical Therapy Goal  Goal: Physical Therapy Goal  Description: Goals to be met by: 2020     Patient will increase functional independence with mobility by performin. Supine to sit with Set-up [place]  2. Sit to supine with Set-up [place]  3. Sit to stand transfer with Supervision  4. Bed to chair transfer with Supervision using Rolling Walker  5. Gait  x 120 feet with SBAusing Rolling Walker.     Outcome: Ongoing, Progressing       Cathi Hinton, PT, DPT  2020

## 2020-08-28 NOTE — PT/OT/SLP PROGRESS
Occupational Therapy   Treatment    Name: Magaly Hutchinson  MRN: [id number]  Admitting Diagnosis:  Liver mass, right lobe  4 Days Post-Op    Recommendations:     Discharge Recommendations: home with home health  Discharge Equipment Recommendations:  walker, rolling  Barriers to discharge:  None    Assessment:     Magaly Hutchinson is a 84 y.o. female with a medical diagnosis of Liver mass, right lobe. Performance deficits affecting function are weakness, impaired endurance, impaired self care skills, impaired functional mobilty, gait instability, impaired balance, decreased coordination.     Rehab Prognosis:  Good; patient would benefit from acute skilled OT services to address these deficits and reach maximum level of function.       Plan:     Patient to be seen 4 x/week to address the above listed problems via self-care/home management, therapeutic activities, therapeutic exercises  · Plan of Care Expires: 09/25/20  · Plan of Care Reviewed with: patient    Subjective     Pain/Comfort:  · Pain Rating 1: 0/10    Objective:     Communicated with: rn prior to session.  Patient found on BSC with hernandez catheter, oxygen, peripheral IV upon OT entry to room.    General Precautions: Standard, fall   Orthopedic Precautions:    Braces:       Occupational Performance:     Functional Mobility/Transfers:  · Patient completed Sit <> Stand Transfer with stand by assistance  with  rolling walker   · Patient completed Toilet Transfer Stand Pivot technique with stand by assistance with  rolling walker and bedside commode    Activities of Daily Living:  · Toileting: supervision for [facility].    [facility] 6 Click ADL: 21    Treatment & Education:  From chair level, pt completed BUE therex (x 15 reps each) including:  - elbow flexion/extension  - straight arm raises (with AAROM on right)  - hor Abd/Add    **Issued pt UE ROM exercise handout.  ** Educated on using wall-walking technique to achieve (R) shld ROM.    Patient left up in chair with call  button in reachEducation:      GOALS:   Multidisciplinary Problems     Occupational Therapy Goals        Problem: Occupational Therapy Goal    Goal Priority Disciplines Outcome Interventions   Occupational Therapy Goal     OT, PT/OT Ongoing, Progressing    Description: Goals to be met by: 9/2/20    Patient will increase functional independence with ADLs by performing:    LE Dressing with Minimal Assistance and Assistive Devices as needed.  Grooming while standing at sink with Supervision.  Toileting from toilet with Contact Guard Assistance for hygiene and clothing management. MET 8/28 - (I)  Supine to sit with Supervision.  Toilet transfer to toilet with Supervision.                     Time Tracking:     OT Date of Treatment: 08/28/20  OT Start Time: 1056  OT Stop Time: 1127  OT Total Time (min): 31 min    Billable Minutes:Therapeutic Activity 10  Therapeutic Exercise 21    MARYBETH Hernandez  8/28/2020

## 2020-08-28 NOTE — SUBJECTIVE & OBJECTIVE
Scheduled Meds:   acetaminophen  650 mg Oral Q6H    bisacodyL  10 mg Oral QHS    docusate sodium  100 mg Oral TID    heparin (porcine)  5,000 Units Subcutaneous Q8H    ibuprofen  400 mg Oral TID    k phos di & mono-sod phos mono  250 mg Oral BID    midodrine  5 mg Oral TID    polyethylene glycol  17 g Oral Daily     Continuous Infusions:  PRN Meds:bisacodyL, ceFAZolin (ANCEF) IVPB, dextrose 50%, glucagon (human recombinant), insulin aspart U-100, oxyCODONE, oxyCODONE    Review of Systems   Constitutional: Positive for activity change. Negative for appetite change, chills, fatigue and fever.   HENT: Negative for congestion and facial swelling.    Eyes: Negative for pain, discharge and visual disturbance.   Respiratory: Negative for cough, chest tightness, shortness of breath and wheezing.    Cardiovascular: Positive for leg swelling. Negative for chest pain and palpitations.   Gastrointestinal: Positive for abdominal distention and abdominal pain. Negative for constipation, diarrhea, nausea and vomiting.   Endocrine: Negative.    Genitourinary: Negative for decreased urine volume, difficulty urinating and hematuria.   Musculoskeletal: Negative for back pain.   Skin: Positive for wound. Negative for pallor and rash.   Allergic/Immunologic: Negative for immunocompromised state.   Neurological: Positive for weakness. Negative for dizziness, tremors and light-headedness.   Hematological: Negative.    Psychiatric/Behavioral: Negative for agitation, confusion and dysphoric mood. The patient is not nervous/anxious.      Objective:     Vital Signs (Most Recent):  Temp: 98.1 °F (36.7 °C) (08/28/20 1141)  Pulse: 68 (08/28/20 1141)  Resp: 16 (08/28/20 1141)  BP: 113/71 (08/28/20 1141)  SpO2: 96 % (08/28/20 1141) Vital Signs (24h Range):  Temp:  [97.6 °F (36.4 °C)-98.8 °F (37.1 °C)] 98.1 °F (36.7 °C)  Pulse:  [68-82] 68  Resp:  [12-20] 16  SpO2:  [92 %-96 %] 96 %  BP: (103-125)/(60-75) 113/71     Weight: 94.2 kg (207 lb  10.8 oz)  Body mass index is 33.52 kg/m².    Intake/Output - Last 3 Shifts       08/26 0700 - 08/27 0659 08/27 0700 - 08/28 0659 08/28 0700 - 08/29 0659    P.O. 1080 1080 825    I.V. (mL/kg) 1705.4 (19.1) 100 (1.1)     Other 0 0     IV Piggyback   50    Total Intake(mL/kg) 2785.4 (31.1) 1180 (12.5) 875 (9.3)    Urine (mL/kg/hr) 2650 (1.2) 2700 (1.2) 0 (0)    Emesis/NG output 0 0 0    Drains 310 130     Other 0 0 0    Stool 0 0 0    Blood 0 0 0    Total Output 2960 2830 0    Net -174.6 -1650 +875           Urine Occurrence 0 x 1 x 4 x    Stool Occurrence 0 x 1 x 4 x    Emesis Occurrence 0 x 0 x 0 x          Physical Exam  Vitals signs and nursing note reviewed.   Constitutional:       Appearance: She is well-developed.   HENT:      Head: Normocephalic and atraumatic.   Eyes:      General: No scleral icterus.     Pupils: Pupils are equal, round, and reactive to light.   Neck:      Musculoskeletal: Normal range of motion and neck supple.      Vascular: No JVD.   Cardiovascular:      Rate and Rhythm: Normal rate and regular rhythm.      Heart sounds: Normal heart sounds. No murmur.   Pulmonary:      Effort: Pulmonary effort is normal. No respiratory distress.      Breath sounds: Examination of the right-lower field reveals decreased breath sounds. Examination of the left-lower field reveals decreased breath sounds. Decreased breath sounds present. No wheezing.   Abdominal:      General: Bowel sounds are normal. There is no distension.      Palpations: Abdomen is soft.      Comments: Midline inc with staples intact no s/s/i   Musculoskeletal: Normal range of motion.   Skin:     General: Skin is warm and dry.   Neurological:      Mental Status: She is alert and oriented to person, place, and time.   Psychiatric:         Behavior: Behavior normal.         Laboratory:  Immunosuppressants     None        CBC:   Recent Labs   Lab 08/28/20  0621   WBC 6.03   RBC 2.73*   HGB 7.9*   HCT 26.1*   *   MCV 96   MCH 28.9    MCHC 30.3*     CMP:   Recent Labs   Lab 08/28/20  0621   GLU 91   CALCIUM 8.0*   ALBUMIN 2.7*   PROT 5.2*      K 3.4*   CO2 30*      BUN 11   CREATININE 0.6   ALKPHOS 65   ALT 9*   AST 14   BILITOT 1.2*     Labs within the past 24 hours have been reviewed.    Diagnostic Results:  I have personally reviewed all pertinent imaging studies.    Debility/Functional status: Patient debilitated by evidence of Weakness. Physical and occupational therapy ordered daily to evaluate and treat. Debility was: present on admission.

## 2020-08-28 NOTE — PROGRESS NOTES
[facility]  Liver Transplant  Progress Note    Patient Name: Magaly Hutchinson  MRN: [id number]  Admission Date: 8/24/2020  Hospital Length of Stay: 4 days  Code Status: No Order  Primary Care Provider: Primary Doctor No  Post-Operative Day:     ORGAN:     Disease Etiology:   Donor Type:     CDC High Risk:     Donor CMV Status:   Donor CMV Status:   Donor HBcAB:     Donor HCV Status:     Donor HBV BENJAMIN: Organ record is missing.  Donor HCV BENJAMIN: Organ record is missing.  Whole or Partial:   Biliary Anastomosis:   Arterial Anatomy:   Subjective:     History of Present Illness:  Ms. Magaly Hutchinson is a 84 year old female with hx of abdominal distention over the past several months which she attributed to weight gain. 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 She has also noted increased edema to her lower extremities, right greater than left.     Hospital Course:  Pt is s/p massive cystic mass resection on 8/24/20. Pt with large right retroperitoneum mass excision with significant blood loss intra op and administration of 11 units PRBCs, 11 units FFP, 2 packs of platelets, and 2 CRYO. Post op pt hypotensive on PACU and upon transfer to TSU. Required 500 ml bolus of LR overnight. BP improved slightly since bolus and starting Midodrine.    Interval history: No acute events overnight. Pt feeling better. Progressing well post-op. Pain controlled, having BMs, tolerating diet and ambulating with assistance. Gerard catheter removed and pt voiding well via pure wick. IVFs dc'd. Diurese with lasix IV. Albumin ordered x 3 doses 8/27 w/ good response. Pt tolerating diet. Denies nausea/vomiting. Continue stool regimen. Monitor.     Scheduled Meds:   acetaminophen  650 mg Oral Q6H    bisacodyL  10 mg Oral QHS    docusate sodium  100 mg Oral TID    heparin (porcine)  5,000 Units  Subcutaneous Q8H    ibuprofen  400 mg Oral TID    k phos di & mono-sod phos mono  250 mg Oral BID    midodrine  5 mg Oral TID    polyethylene glycol  17 g Oral Daily     Continuous Infusions:  PRN Meds:bisacodyL, ceFAZolin (ANCEF) IVPB, dextrose 50%, glucagon (human recombinant), insulin aspart U-100, oxyCODONE, oxyCODONE    Review of Systems   Constitutional: Positive for activity change. Negative for appetite change, chills, fatigue and fever.   HENT: Negative for congestion and facial swelling.    Eyes: Negative for pain, discharge and visual disturbance.   Respiratory: Negative for cough, chest tightness, shortness of breath and wheezing.    Cardiovascular: Positive for leg swelling. Negative for chest pain and palpitations.   Gastrointestinal: Positive for abdominal distention and abdominal pain. Negative for constipation, diarrhea, nausea and vomiting.   Endocrine: Negative.    Genitourinary: Negative for decreased urine volume, difficulty urinating and hematuria.   Musculoskeletal: Negative for back pain.   Skin: Positive for wound. Negative for pallor and rash.   Allergic/Immunologic: Negative for immunocompromised state.   Neurological: Positive for weakness. Negative for dizziness, tremors and light-headedness.   Hematological: Negative.    Psychiatric/Behavioral: Negative for agitation, confusion and dysphoric mood. The patient is not nervous/anxious.      Objective:     Vital Signs (Most Recent):  Temp: 98.1 °F (36.7 °C) (08/28/20 1141)  Pulse: 68 (08/28/20 1141)  Resp: 16 (08/28/20 1141)  BP: 113/71 (08/28/20 1141)  SpO2: 96 % (08/28/20 1141) Vital Signs (24h Range):  Temp:  [97.6 °F (36.4 °C)-98.8 °F (37.1 °C)] 98.1 °F (36.7 °C)  Pulse:  [68-82] 68  Resp:  [12-20] 16  SpO2:  [92 %-96 %] 96 %  BP: (103-125)/(60-75) 113/71     Weight: 94.2 kg (207 lb 10.8 oz)  Body mass index is 33.52 kg/m².    Intake/Output - Last 3 Shifts       08/26 0700 - 08/27 0659 08/27 0700 - 08/28 0659 08/28 0700 - 08/29 0659     P.O. 1080 1080 825    I.V. (mL/kg) 1705.4 (19.1) 100 (1.1)     Other 0 0     IV Piggyback   50    Total Intake(mL/kg) 2785.4 (31.1) 1180 (12.5) 875 (9.3)    Urine (mL/kg/hr) 2650 (1.2) 2700 (1.2) 0 (0)    Emesis/NG output 0 0 0    Drains 310 130     Other 0 0 0    Stool 0 0 0    Blood 0 0 0    Total Output 2960 2830 0    Net -174.6 -1650 +875           Urine Occurrence 0 x 1 x 4 x    Stool Occurrence 0 x 1 x 4 x    Emesis Occurrence 0 x 0 x 0 x          Physical Exam  Vitals signs and nursing note reviewed.   Constitutional:       Appearance: She is well-developed.   HENT:      Head: Normocephalic and atraumatic.   Eyes:      General: No scleral icterus.     Pupils: Pupils are equal, round, and reactive to light.   Neck:      Musculoskeletal: Normal range of motion and neck supple.      Vascular: No JVD.   Cardiovascular:      Rate and Rhythm: Normal rate and regular rhythm.      Heart sounds: Normal heart sounds. No murmur.   Pulmonary:      Effort: Pulmonary effort is normal. No respiratory distress.      Breath sounds: Examination of the right-lower field reveals decreased breath sounds. Examination of the left-lower field reveals decreased breath sounds. Decreased breath sounds present. No wheezing.   Abdominal:      General: Bowel sounds are normal. There is no distension.      Palpations: Abdomen is soft.      Comments: Midline inc with staples intact no s/s/i   Musculoskeletal: Normal range of motion.   Skin:     General: Skin is warm and dry.   Neurological:      Mental Status: She is alert and oriented to person, place, and time.   Psychiatric:         Behavior: Behavior normal.         Laboratory:  Immunosuppressants     None        CBC:   Recent Labs   Lab 08/28/20  0621   WBC 6.03   RBC 2.73*   HGB 7.9*   HCT 26.1*   *   MCV 96   MCH 28.9   MCHC 30.3*     CMP:   Recent Labs   Lab 08/28/20  0621   GLU 91   CALCIUM 8.0*   ALBUMIN 2.7*   PROT 5.2*      K 3.4*   CO2 30*      BUN 11    CREATININE 0.6   ALKPHOS 65   ALT 9*   AST 14   BILITOT 1.2*     Labs within the past 24 hours have been reviewed.    Diagnostic Results:  I have personally reviewed all pertinent imaging studies.    Debility/Functional status: Patient debilitated by evidence of Weakness. Physical and occupational therapy ordered daily to evaluate and treat. Debility was: present on admission.    Assessment/Plan:     * Liver mass, right lobe  - s/p massive cystic mass resection 8/24 with large volume blood loss intra op requiring 11 unit PRBCs, 11 units FFP, 1 pk plts.  - pt hypotensive immediately post op and on transfer to TSU.  - Required 500 ml bolus of LR.  - BP improving.   - Continue Midodrine 5 mg tid.  - Tolerating diet, pain controlled with Tylenol/Ibuprofen.  - Encouraged oob, ambulation, working with PT.  - Gerard catheter removed 8/27.  - dc NATALEE 8/27.  - Diurese with lasix 20 mg IV and Albumin x 3 8/27.  - Diamox given 8/28 for diuresis.   - Monitor.       Hypophosphatemia  - Decrease KPN to 250 mg bid.  - trend lab work daily.       Anemia  - H&H decreased but stable.  - Type and screen 8/27.  - H&H remains stable and will hold off on transfusion at this time.   - Monitor.     Constipation  - continue stool regimen.      Edema  - BLE edema, R>L.  - Encouraged to elevated LE in bed/chair.  - Albumin and lasix 8/27 w/ good response.   - plan for diamox today for diuresis.   - Monitor.          VTE Risk Mitigation (From admission, onward)         Ordered     heparin (porcine) injection 5,000 Units  Every 8 hours      08/25/20 0920                The patients clinical status was discussed at multidisplinary rounds, involving transplant surgery, transplant medicine, pharmacy, nursing, nutrition, and social work    Discharge Planning:  No Patient Care Coordination Note on file.      Jose Manuel Sousa NP  Liver Transplant  [facility]

## 2020-08-28 NOTE — ASSESSMENT & PLAN NOTE
- s/p massive cystic mass resection 8/24 with large volume blood loss intra op requiring 11 unit PRBCs, 11 units FFP, 1 pk plts.  - pt hypotensive immediately post op and on transfer to TSU.  - Required 500 ml bolus of LR.  - BP improving.   - Continue Midodrine 5 mg tid.  - Tolerating diet, pain controlled with Tylenol/Ibuprofen.  - Encouraged oob, ambulation, working with PT.  - Gerard catheter removed 8/27.  - dc NATALEE 8/27.  - Diurese with lasix 20 mg IV and Albumin x 3 8/27.  - Diamox given 8/28 for diuresis.   - Monitor.

## 2020-08-28 NOTE — ASSESSMENT & PLAN NOTE
- BLE edema, R>L.  - Encouraged to elevated LE in bed/chair.  - Albumin and lasix 8/27 w/ good response.   - plan for diamox today for diuresis.   - Monitor.

## 2020-08-28 NOTE — PT/OT/SLP PROGRESS
Physical Therapy Treatment    Patient Name:  Magaly Hutchinson   MRN:  [id number]    Recommendations:     Discharge Recommendations:  home, home health PT   Discharge Equipment Recommendations: none   Barriers to discharge: None    Assessment:     Magaly Hutchinson is a 84 y.o. female admitted with a medical diagnosis of Liver mass, right lobe.  She presents with the following impairments/functional limitations:  weakness, impaired endurance, impaired self care skills, impaired functional mobilty, gait instability, decreased lower extremity function, decreased safety awareness, pain, decreased ROM, edema, impaired cardiopulmonary response to activity Patient pleasant and motivated to participate in therapy, progressing well. CGA for sit to stand transfer with RW and SBA for Gait of ~65' with RW. Mild SOB noted during gait, no LOB or gait instabilities. Patient is safe to return home once medically ready and would benefit from home health physical therapy in order to improve safety and independence in the home environment.     Rehab Prognosis: Good; patient would benefit from acute skilled PT services to address these deficits and reach maximum level of function.    Recent Surgery: Procedure(s) (LRB):  EXCISION, MASS, RETROPERITONEUM (Right) 4 Days Post-Op    Plan:     During this hospitalization, patient to be seen 3 x/week to address the identified rehab impairments via gait training, therapeutic activities, therapeutic exercises, neuromuscular re-education and progress toward the following goals:    · Plan of Care Expires:  09/20/20    Subjective     Chief Complaint: abdominal discomfort  Patient/Family Comments/goals: to return home and return to PLOF  Pain/Comfort:  · Pain Rating 1: 0/10  · Pain Rating Post-Intervention 1: 0/10      Objective:     Communicated with nurse prior to session.  Patient found up in chair with PureWick, peripheral IV upon PT entry to room.     General Precautions: Standard, fall   Orthopedic  Precautions:N/A   Braces: N/A     Functional Mobility:  · Bed Mobility:     · Transfers:     · Sit to Stand:  contact guard assistance with rolling walker  · Gait: ~65' within room with RW, SBA. V/t cues for walker management, to stay inside walker. 2-3 standing rest breaks. Patient instucted to take rest breaks when reaching 4/10 on Modified Mansi Dyspnea Scale. No LOB. Wide MAC and decreased gait speed but patient demonstrates steady gait with use of RW.        AM-PAC 6 CLICK MOBILITY  Turning over in bed (including adjusting bedclothes, sheets and blankets)?: 3  Sitting down on and standing up from a chair with arms (e.g., wheelchair, bedside commode, etc.): 3  Moving from lying on back to sitting on the side of the bed?: 3  Moving to and from a bed to a chair (including a wheelchair)?: 3  Need to walk in hospital room?: 3  Climbing 3-5 steps with a railing?: 3  Basic Mobility Total Score: 18       Therapeutic Activities and Exercises:   Pt educated on plan and goals with physical therapy. Pt educated on importance of OOB activity to decrease the risks associated with bed rest.   Patient educated on AP, LAQ, AB/ADD, seated heel raises and encouraged to perform ~20 every 1-2 hours. Completed 1 set each with PT.  Patient educated on seated marching and encouraged to perform ~20reps 3-4x/day.   Increased time spend active listening.   Educated patient on DME choice, indication for use of RW v. Rollator when in the home or community.   Educated patient on fall prevention strategies in the home.   Pt educated on activity pacing   Patient educated on mansi dyspnea scale, practiced with PT during gait.   All questions and concerns addressed within PT scope of practice.   Instructed to call nursing for assistance with out of bed mobility.     Patient left up in chair with all lines intact and call button in reach..    GOALS:   Multidisciplinary Problems     Physical Therapy Goals        Problem: Physical Therapy Goal     Goal Priority Disciplines Outcome Goal Variances Interventions   Physical Therapy Goal     PT, PT/OT Ongoing, Progressing     Description: Goals to be met by: 2020     Patient will increase functional independence with mobility by performin. Supine to sit with Set-up [place]  2. Sit to supine with Set-up [place]  3. Sit to stand transfer with Supervision  4. Bed to chair transfer with Supervision using Rolling Walker  5. Gait  x 120 feet with SBAusing Rolling Walker.                      Time Tracking:     PT Received On: 20  PT Start Time: 1321     PT Stop Time: 1352  PT Total Time (min): 31 min     Billable Minutes: Gait Training 11 and Therapeutic Activity 20    Treatment Type: Treatment  PT/PTA: PT     PTA Visit Number: 0     Cathi Hinton, PT  2020

## 2020-08-28 NOTE — PLAN OF CARE
AAOx4. VSS. Afebrile.  Scheduled tylenol/ibuprofen given for mild abd pain.  Liver resection done 8/24. Midline incision NATALIE w staples.  Purewick in place. Clear yellow urine.  Multiple BM today. Bowel regimen continued.  PO K replacement given. K 3.4.  Diamox given per orders.   OOBTC w assist. Worked w PT/OT today.  Diab diet. ACHS. No SSI needed.  OOBTC w assist. Call bell in reach. WCTM.

## 2020-08-28 NOTE — ASSESSMENT & PLAN NOTE
- H&H decreased but stable.  - Type and screen 8/27.  - H&H remains stable and will hold off on transfusion at this time.   - Monitor.

## 2020-08-29 LAB
ALBUMIN SERPL BCP-MCNC: 2.4 G/DL (ref 3.5–5.2)
ALP SERPL-CCNC: 69 U/L (ref 55–135)
ALT SERPL W/O P-5'-P-CCNC: 7 U/L (ref 10–44)
ANION GAP SERPL CALC-SCNC: 7 MMOL/L (ref 8–16)
AST SERPL-CCNC: 13 U/L (ref 10–40)
BASOPHILS # BLD AUTO: 0.04 K/UL (ref 0–0.2)
BASOPHILS NFR BLD: 0.5 % (ref 0–1.9)
BILIRUB SERPL-MCNC: 0.8 MG/DL (ref 0.1–1)
BUN SERPL-MCNC: 9 MG/DL (ref 8–23)
CALCIUM SERPL-MCNC: 7.9 MG/DL (ref 8.7–10.5)
CHLORIDE SERPL-SCNC: 113 MMOL/L (ref 95–110)
CO2 SERPL-SCNC: 21 MMOL/L (ref 23–29)
CREAT SERPL-MCNC: 0.6 MG/DL (ref 0.5–1.4)
DIFFERENTIAL METHOD: ABNORMAL
EOSINOPHIL # BLD AUTO: 0.4 K/UL (ref 0–0.5)
EOSINOPHIL NFR BLD: 4.7 % (ref 0–8)
ERYTHROCYTE [DISTWIDTH] IN BLOOD BY AUTOMATED COUNT: 18.3 % (ref 11.5–14.5)
EST. GFR  (AFRICAN AMERICAN): >60 ML/MIN/1.73 M^2
EST. GFR  (NON AFRICAN AMERICAN): >60 ML/MIN/1.73 M^2
GLUCOSE SERPL-MCNC: 77 MG/DL (ref 70–110)
HCT VFR BLD AUTO: 27.4 % (ref 37–48.5)
HGB BLD-MCNC: 8.4 G/DL (ref 12–16)
IMM GRANULOCYTES # BLD AUTO: 0.16 K/UL (ref 0–0.04)
IMM GRANULOCYTES NFR BLD AUTO: 2 % (ref 0–0.5)
LYMPHOCYTES # BLD AUTO: 0.9 K/UL (ref 1–4.8)
LYMPHOCYTES NFR BLD: 11.9 % (ref 18–48)
MCH RBC QN AUTO: 29.7 PG (ref 27–31)
MCHC RBC AUTO-ENTMCNC: 30.7 G/DL (ref 32–36)
MCV RBC AUTO: 97 FL (ref 82–98)
MONOCYTES # BLD AUTO: 0.5 K/UL (ref 0.3–1)
MONOCYTES NFR BLD: 5.9 % (ref 4–15)
NEUTROPHILS # BLD AUTO: 5.9 K/UL (ref 1.8–7.7)
NEUTROPHILS NFR BLD: 75 % (ref 38–73)
NRBC BLD-RTO: 0 /100 WBC
PHOSPHATE SERPL-MCNC: 3.1 MG/DL (ref 2.7–4.5)
PLATELET # BLD AUTO: 151 K/UL (ref 150–350)
PMV BLD AUTO: 10.4 FL (ref 9.2–12.9)
POCT GLUCOSE: 85 MG/DL (ref 70–110)
POCT GLUCOSE: 87 MG/DL (ref 70–110)
POCT GLUCOSE: 90 MG/DL (ref 70–110)
POTASSIUM SERPL-SCNC: 3.8 MMOL/L (ref 3.5–5.1)
PROT SERPL-MCNC: 5.3 G/DL (ref 6–8.4)
RBC # BLD AUTO: 2.83 M/UL (ref 4–5.4)
SODIUM SERPL-SCNC: 141 MMOL/L (ref 136–145)
WBC # BLD AUTO: 7.83 K/UL (ref 3.9–12.7)

## 2020-08-29 PROCEDURE — 20600001 HC STEP DOWN PRIVATE ROOM

## 2020-08-29 PROCEDURE — 94761 N-INVAS EAR/PLS OXIMETRY MLT: CPT

## 2020-08-29 PROCEDURE — 80053 COMPREHEN METABOLIC PANEL: CPT

## 2020-08-29 PROCEDURE — 84100 ASSAY OF PHOSPHORUS: CPT

## 2020-08-29 PROCEDURE — 36415 COLL VENOUS BLD VENIPUNCTURE: CPT

## 2020-08-29 PROCEDURE — 25000003 PHARM REV CODE 250: Performed by: PHYSICIAN ASSISTANT

## 2020-08-29 PROCEDURE — 85025 COMPLETE CBC W/AUTO DIFF WBC: CPT

## 2020-08-29 PROCEDURE — 25000003 PHARM REV CODE 250: Performed by: NURSE PRACTITIONER

## 2020-08-29 PROCEDURE — 63600175 PHARM REV CODE 636 W HCPCS: Performed by: STUDENT IN AN ORGANIZED HEALTH CARE EDUCATION/TRAINING PROGRAM

## 2020-08-29 RX ORDER — IBUPROFEN 400 MG/1
400 TABLET ORAL EVERY 8 HOURS PRN
Status: DISCONTINUED | OUTPATIENT
Start: 2020-08-29 | End: 2020-08-31 | Stop reason: HOSPADM

## 2020-08-29 RX ADMIN — HEPARIN SODIUM 5000 UNITS: 5000 INJECTION INTRAVENOUS; SUBCUTANEOUS at 02:08

## 2020-08-29 RX ADMIN — DIBASIC SODIUM PHOSPHATE, MONOBASIC POTASSIUM PHOSPHATE AND MONOBASIC SODIUM PHOSPHATE 1 TABLET: 852; 155; 130 TABLET ORAL at 09:08

## 2020-08-29 RX ADMIN — DOCUSATE SODIUM 100 MG: 100 CAPSULE, LIQUID FILLED ORAL at 02:08

## 2020-08-29 RX ADMIN — IBUPROFEN 400 MG: 400 TABLET, FILM COATED ORAL at 08:08

## 2020-08-29 RX ADMIN — MIDODRINE HYDROCHLORIDE 5 MG: 5 TABLET ORAL at 09:08

## 2020-08-29 RX ADMIN — DOCUSATE SODIUM 100 MG: 100 CAPSULE, LIQUID FILLED ORAL at 08:08

## 2020-08-29 RX ADMIN — HEPARIN SODIUM 5000 UNITS: 5000 INJECTION INTRAVENOUS; SUBCUTANEOUS at 09:08

## 2020-08-29 RX ADMIN — MIDODRINE HYDROCHLORIDE 5 MG: 5 TABLET ORAL at 02:08

## 2020-08-29 RX ADMIN — DIBASIC SODIUM PHOSPHATE, MONOBASIC POTASSIUM PHOSPHATE AND MONOBASIC SODIUM PHOSPHATE 1 TABLET: 852; 155; 130 TABLET ORAL at 08:08

## 2020-08-29 RX ADMIN — HEPARIN SODIUM 5000 UNITS: 5000 INJECTION INTRAVENOUS; SUBCUTANEOUS at 05:08

## 2020-08-29 RX ADMIN — MIDODRINE HYDROCHLORIDE 5 MG: 5 TABLET ORAL at 08:08

## 2020-08-29 RX ADMIN — DOCUSATE SODIUM 100 MG: 100 CAPSULE, LIQUID FILLED ORAL at 09:08

## 2020-08-29 NOTE — PLAN OF CARE
AAOx4, VSS. SpO2 > 92% on RA.   RFA 20g PIV saline locked. Dressing CDI.   Midline abd incision destinee with staples. CDI. Approximated.   BG checks achs. SSI administered per MAR.   Purewick draining clear, yellow urine.   Pain well controlled on scheduled tylenol.  No issues this shift.    1 person assist to BSC. Wears non slip socks when oob. Free from falls/injuries.  Bed in lowest, locked position. Bed rails up x3. Call light and personal belongings within reach.   Will continue to monitor.

## 2020-08-29 NOTE — PROGRESS NOTES
Liver Transplant   Progress Note        HPI:  Ms. Hutchinson is a 84 y.o. year old female who is s/p liver resection.       Interval History: No acute events overnight. Pt reports feeling well this am. Progressing well post-op.         Sub/Obj: BLE+1 decreased breath sounds at bases bilat      Labs:  CBC:   Recent Labs   Lab 08/29/20  0648   WBC 7.83   RBC 2.83*   HGB 8.4*   HCT 27.4*      MCV 97   MCH 29.7   MCHC 30.7*     CMP:   Recent Labs   Lab 08/29/20  0648   GLU 77   CALCIUM 7.9*   ALBUMIN 2.4*   PROT 5.3*      K 3.8   CO2 21*   *   BUN 9   CREATININE 0.6   ALKPHOS 69   ALT 7*   AST 13   BILITOT 0.8     Coagulation:   Recent Labs   Lab 08/24/20  1153   INR 1.3*   APTT 36.0*     Labs within the past 24 hours have been reviewed.        Assessment/Plan:     1. S/p liver resection.     2. Anemia of chronic disease- H&H stable. Monitor.     3. LFTs improving.  4. Progressing well post-op  5. Pain controlled, ambulating, tolerating diet, and having BMs.   6. Trend lab work closely.           Pt seen, examined and discussed with collaborating staff transplant surgeon.

## 2020-08-30 LAB
ALBUMIN SERPL BCP-MCNC: 2.2 G/DL (ref 3.5–5.2)
ALP SERPL-CCNC: 72 U/L (ref 55–135)
ALT SERPL W/O P-5'-P-CCNC: 7 U/L (ref 10–44)
ANION GAP SERPL CALC-SCNC: 6 MMOL/L (ref 8–16)
AST SERPL-CCNC: 16 U/L (ref 10–40)
BASOPHILS # BLD AUTO: 0.02 K/UL (ref 0–0.2)
BASOPHILS NFR BLD: 0.3 % (ref 0–1.9)
BILIRUB SERPL-MCNC: 0.6 MG/DL (ref 0.1–1)
BUN SERPL-MCNC: 8 MG/DL (ref 8–23)
CALCIUM SERPL-MCNC: 8.1 MG/DL (ref 8.7–10.5)
CHLORIDE SERPL-SCNC: 113 MMOL/L (ref 95–110)
CO2 SERPL-SCNC: 21 MMOL/L (ref 23–29)
CREAT SERPL-MCNC: 0.6 MG/DL (ref 0.5–1.4)
DIFFERENTIAL METHOD: ABNORMAL
EOSINOPHIL # BLD AUTO: 0.3 K/UL (ref 0–0.5)
EOSINOPHIL NFR BLD: 4.5 % (ref 0–8)
ERYTHROCYTE [DISTWIDTH] IN BLOOD BY AUTOMATED COUNT: 18.6 % (ref 11.5–14.5)
EST. GFR  (AFRICAN AMERICAN): >60 ML/MIN/1.73 M^2
EST. GFR  (NON AFRICAN AMERICAN): >60 ML/MIN/1.73 M^2
GLUCOSE SERPL-MCNC: 87 MG/DL (ref 70–110)
HCT VFR BLD AUTO: 26.2 % (ref 37–48.5)
HGB BLD-MCNC: 8 G/DL (ref 12–16)
IMM GRANULOCYTES # BLD AUTO: 0.2 K/UL (ref 0–0.04)
IMM GRANULOCYTES NFR BLD AUTO: 2.9 % (ref 0–0.5)
LYMPHOCYTES # BLD AUTO: 1 K/UL (ref 1–4.8)
LYMPHOCYTES NFR BLD: 14.7 % (ref 18–48)
MCH RBC QN AUTO: 29.5 PG (ref 27–31)
MCHC RBC AUTO-ENTMCNC: 30.5 G/DL (ref 32–36)
MCV RBC AUTO: 97 FL (ref 82–98)
MONOCYTES # BLD AUTO: 0.5 K/UL (ref 0.3–1)
MONOCYTES NFR BLD: 7 % (ref 4–15)
NEUTROPHILS # BLD AUTO: 4.9 K/UL (ref 1.8–7.7)
NEUTROPHILS NFR BLD: 70.6 % (ref 38–73)
NRBC BLD-RTO: 0 /100 WBC
PHOSPHATE SERPL-MCNC: 3.3 MG/DL (ref 2.7–4.5)
PLATELET # BLD AUTO: 194 K/UL (ref 150–350)
PMV BLD AUTO: 9.6 FL (ref 9.2–12.9)
POCT GLUCOSE: 82 MG/DL (ref 70–110)
POCT GLUCOSE: 88 MG/DL (ref 70–110)
POCT GLUCOSE: 88 MG/DL (ref 70–110)
POCT GLUCOSE: 89 MG/DL (ref 70–110)
POTASSIUM SERPL-SCNC: 3.7 MMOL/L (ref 3.5–5.1)
PROT SERPL-MCNC: 5.1 G/DL (ref 6–8.4)
RBC # BLD AUTO: 2.71 M/UL (ref 4–5.4)
SODIUM SERPL-SCNC: 140 MMOL/L (ref 136–145)
WBC # BLD AUTO: 6.87 K/UL (ref 3.9–12.7)

## 2020-08-30 PROCEDURE — 94761 N-INVAS EAR/PLS OXIMETRY MLT: CPT

## 2020-08-30 PROCEDURE — 20600001 HC STEP DOWN PRIVATE ROOM

## 2020-08-30 PROCEDURE — 63600175 PHARM REV CODE 636 W HCPCS: Performed by: STUDENT IN AN ORGANIZED HEALTH CARE EDUCATION/TRAINING PROGRAM

## 2020-08-30 PROCEDURE — 25000003 PHARM REV CODE 250: Performed by: NURSE PRACTITIONER

## 2020-08-30 PROCEDURE — 85025 COMPLETE CBC W/AUTO DIFF WBC: CPT

## 2020-08-30 PROCEDURE — 80053 COMPREHEN METABOLIC PANEL: CPT

## 2020-08-30 PROCEDURE — 25000003 PHARM REV CODE 250: Performed by: PHYSICIAN ASSISTANT

## 2020-08-30 PROCEDURE — 36415 COLL VENOUS BLD VENIPUNCTURE: CPT

## 2020-08-30 PROCEDURE — 84100 ASSAY OF PHOSPHORUS: CPT

## 2020-08-30 RX ADMIN — DOCUSATE SODIUM 100 MG: 100 CAPSULE, LIQUID FILLED ORAL at 09:08

## 2020-08-30 RX ADMIN — HEPARIN SODIUM 5000 UNITS: 5000 INJECTION INTRAVENOUS; SUBCUTANEOUS at 05:08

## 2020-08-30 RX ADMIN — HEPARIN SODIUM 5000 UNITS: 5000 INJECTION INTRAVENOUS; SUBCUTANEOUS at 09:08

## 2020-08-30 RX ADMIN — MIDODRINE HYDROCHLORIDE 5 MG: 5 TABLET ORAL at 07:08

## 2020-08-30 RX ADMIN — BISACODYL 10 MG: 5 TABLET, COATED ORAL at 09:08

## 2020-08-30 RX ADMIN — MIDODRINE HYDROCHLORIDE 5 MG: 5 TABLET ORAL at 09:08

## 2020-08-30 RX ADMIN — DOCUSATE SODIUM 100 MG: 100 CAPSULE, LIQUID FILLED ORAL at 07:08

## 2020-08-30 RX ADMIN — DIBASIC SODIUM PHOSPHATE, MONOBASIC POTASSIUM PHOSPHATE AND MONOBASIC SODIUM PHOSPHATE 1 TABLET: 852; 155; 130 TABLET ORAL at 09:08

## 2020-08-30 RX ADMIN — DOCUSATE SODIUM 100 MG: 100 CAPSULE, LIQUID FILLED ORAL at 03:08

## 2020-08-30 RX ADMIN — DIBASIC SODIUM PHOSPHATE, MONOBASIC POTASSIUM PHOSPHATE AND MONOBASIC SODIUM PHOSPHATE 1 TABLET: 852; 155; 130 TABLET ORAL at 07:08

## 2020-08-30 RX ADMIN — MIDODRINE HYDROCHLORIDE 5 MG: 5 TABLET ORAL at 03:08

## 2020-08-30 NOTE — PROGRESS NOTES
Liver Transplant   Progress Note        HPI:  Ms. Hutchinson is a 84 y.o. year old female who is s/p liver resection.       Interval History: No acute events overnight. Pt reports feeling well this am. Progressing well post-op.         Sub/Obj: BLE+1 decreased breath sounds at bases bilat      Labs:  CBC:   Recent Labs   Lab 08/30/20  0628   WBC 6.87   RBC 2.71*   HGB 8.0*   HCT 26.2*      MCV 97   MCH 29.5   MCHC 30.5*     CMP:   Recent Labs   Lab 08/30/20  0628   GLU 87   CALCIUM 8.1*   ALBUMIN 2.2*   PROT 5.1*      K 3.7   CO2 21*   *   BUN 8   CREATININE 0.6   ALKPHOS 72   ALT 7*   AST 16   BILITOT 0.6     Coagulation:   Recent Labs   Lab 08/24/20  1153   INR 1.3*   APTT 36.0*     Labs within the past 24 hours have been reviewed.        Assessment/Plan:     1. S/p liver resection.     2. Anemia of chronic disease- H&H stable. Monitor.     3. LFTs improving.  4. Progressing well post-op  5. Pain controlled, ambulating, tolerating diet, and having BMs.   6. Trend lab work closely.   7. Possible d/c in am.           Pt seen, examined and discussed with collaborating staff transplant surgeon.

## 2020-08-30 NOTE — PLAN OF CARE
AAOx4, VSS, afebrile overnight  Midline incision remains CDI, denies pain, LFT's trending down  Diamox given x1 8/29, refer to flow sheet for UOP overnight,Cr 0.6  Purewick remains in place overnight for incontinence episodes. Will get up to BSC when prompted.  Sleeping between care, non skid socks worn, call light within reach, will cont to monitor

## 2020-08-30 NOTE — PLAN OF CARE
AAOx4, VSS. SpO2 > 92% on RA.   RFA 20g PIV saline locked. Dressing CDI.   Midline abd incision destinee with staples. CDI. Approximated.   BG checks achs. No coverage indicated per order.   Purewick draining clear, yellow urine.   No complaints of pain. Refusing scheduled tylenol.   No issues this shift.    Poss dc tomorrow.   1 person assist to BSC. Wears non slip socks when oob. Free from falls/injuries.  Bed in lowest, locked position. Bed rails up x3. Call light and personal belongings within reach.   Will continue to monitor.

## 2020-08-31 VITALS
HEART RATE: 98 BPM | HEIGHT: 66 IN | RESPIRATION RATE: 22 BRPM | WEIGHT: 200.63 LBS | SYSTOLIC BLOOD PRESSURE: 129 MMHG | OXYGEN SATURATION: 98 % | TEMPERATURE: 98 F | BODY MASS INDEX: 32.24 KG/M2 | DIASTOLIC BLOOD PRESSURE: 82 MMHG

## 2020-08-31 DIAGNOSIS — Z90.49 HISTORY OF RESECTION OF LIVER: ICD-10-CM

## 2020-08-31 DIAGNOSIS — Z79.02 LONG TERM (CURRENT) USE OF ANTITHROMBOTICS/ANTIPLATELETS: Primary | ICD-10-CM

## 2020-08-31 LAB
ALBUMIN SERPL BCP-MCNC: 2.1 G/DL (ref 3.5–5.2)
ALP SERPL-CCNC: 73 U/L (ref 55–135)
ALT SERPL W/O P-5'-P-CCNC: 6 U/L (ref 10–44)
ANION GAP SERPL CALC-SCNC: 7 MMOL/L (ref 8–16)
AST SERPL-CCNC: 15 U/L (ref 10–40)
BASOPHILS # BLD AUTO: 0.03 K/UL (ref 0–0.2)
BASOPHILS NFR BLD: 0.4 % (ref 0–1.9)
BILIRUB SERPL-MCNC: 0.5 MG/DL (ref 0.1–1)
BUN SERPL-MCNC: 9 MG/DL (ref 8–23)
CALCIUM SERPL-MCNC: 7.9 MG/DL (ref 8.7–10.5)
CHLORIDE SERPL-SCNC: 112 MMOL/L (ref 95–110)
CO2 SERPL-SCNC: 23 MMOL/L (ref 23–29)
CREAT SERPL-MCNC: 0.7 MG/DL (ref 0.5–1.4)
DIFFERENTIAL METHOD: ABNORMAL
EOSINOPHIL # BLD AUTO: 0.4 K/UL (ref 0–0.5)
EOSINOPHIL NFR BLD: 5.5 % (ref 0–8)
ERYTHROCYTE [DISTWIDTH] IN BLOOD BY AUTOMATED COUNT: 18.8 % (ref 11.5–14.5)
EST. GFR  (AFRICAN AMERICAN): >60 ML/MIN/1.73 M^2
EST. GFR  (NON AFRICAN AMERICAN): >60 ML/MIN/1.73 M^2
GLUCOSE SERPL-MCNC: 83 MG/DL (ref 70–110)
HCT VFR BLD AUTO: 26.9 % (ref 37–48.5)
HGB BLD-MCNC: 8 G/DL (ref 12–16)
IMM GRANULOCYTES # BLD AUTO: 0.29 K/UL (ref 0–0.04)
IMM GRANULOCYTES NFR BLD AUTO: 3.7 % (ref 0–0.5)
LYMPHOCYTES # BLD AUTO: 1.3 K/UL (ref 1–4.8)
LYMPHOCYTES NFR BLD: 16.6 % (ref 18–48)
MCH RBC QN AUTO: 28.8 PG (ref 27–31)
MCHC RBC AUTO-ENTMCNC: 29.7 G/DL (ref 32–36)
MCV RBC AUTO: 97 FL (ref 82–98)
MONOCYTES # BLD AUTO: 0.6 K/UL (ref 0.3–1)
MONOCYTES NFR BLD: 7.5 % (ref 4–15)
NEUTROPHILS # BLD AUTO: 5.2 K/UL (ref 1.8–7.7)
NEUTROPHILS NFR BLD: 66.3 % (ref 38–73)
NRBC BLD-RTO: 0 /100 WBC
PHOSPHATE SERPL-MCNC: 4.2 MG/DL (ref 2.7–4.5)
PLATELET # BLD AUTO: 225 K/UL (ref 150–350)
PMV BLD AUTO: 9.5 FL (ref 9.2–12.9)
POCT GLUCOSE: 85 MG/DL (ref 70–110)
POCT GLUCOSE: 87 MG/DL (ref 70–110)
POTASSIUM SERPL-SCNC: 3.7 MMOL/L (ref 3.5–5.1)
PROT SERPL-MCNC: 5.1 G/DL (ref 6–8.4)
RBC # BLD AUTO: 2.78 M/UL (ref 4–5.4)
SODIUM SERPL-SCNC: 142 MMOL/L (ref 136–145)
WBC # BLD AUTO: 7.84 K/UL (ref 3.9–12.7)

## 2020-08-31 PROCEDURE — 94761 N-INVAS EAR/PLS OXIMETRY MLT: CPT

## 2020-08-31 PROCEDURE — 97110 THERAPEUTIC EXERCISES: CPT

## 2020-08-31 PROCEDURE — 84100 ASSAY OF PHOSPHORUS: CPT

## 2020-08-31 PROCEDURE — 97530 THERAPEUTIC ACTIVITIES: CPT

## 2020-08-31 PROCEDURE — 99239 PR HOSPITAL DISCHARGE DAY,>30 MIN: ICD-10-PCS | Mod: ,,, | Performed by: NURSE PRACTITIONER

## 2020-08-31 PROCEDURE — 63600175 PHARM REV CODE 636 W HCPCS: Performed by: STUDENT IN AN ORGANIZED HEALTH CARE EDUCATION/TRAINING PROGRAM

## 2020-08-31 PROCEDURE — 25000003 PHARM REV CODE 250: Performed by: PHYSICIAN ASSISTANT

## 2020-08-31 PROCEDURE — 36415 COLL VENOUS BLD VENIPUNCTURE: CPT

## 2020-08-31 PROCEDURE — 25000003 PHARM REV CODE 250: Performed by: NURSE PRACTITIONER

## 2020-08-31 PROCEDURE — 99239 HOSP IP/OBS DSCHRG MGMT >30: CPT | Mod: ,,, | Performed by: NURSE PRACTITIONER

## 2020-08-31 PROCEDURE — 63600175 PHARM REV CODE 636 W HCPCS: Performed by: NURSE PRACTITIONER

## 2020-08-31 PROCEDURE — 80053 COMPREHEN METABOLIC PANEL: CPT

## 2020-08-31 PROCEDURE — 85025 COMPLETE CBC W/AUTO DIFF WBC: CPT

## 2020-08-31 RX ORDER — BISACODYL 5 MG
10 TABLET, DELAYED RELEASE (ENTERIC COATED) ORAL DAILY PRN
Refills: 0 | COMMUNITY
Start: 2020-08-31

## 2020-08-31 RX ORDER — DOCUSATE SODIUM 100 MG/1
100 CAPSULE, LIQUID FILLED ORAL 3 TIMES DAILY PRN
Refills: 0 | COMMUNITY
Start: 2020-08-31

## 2020-08-31 RX ORDER — POLYETHYLENE GLYCOL 3350 17 G/17G
17 POWDER, FOR SOLUTION ORAL DAILY PRN
Refills: 0 | COMMUNITY
Start: 2020-08-31

## 2020-08-31 RX ORDER — ENOXAPARIN SODIUM 100 MG/ML
40 INJECTION SUBCUTANEOUS DAILY
Qty: 2.8 ML | Refills: 0 | Status: SHIPPED | OUTPATIENT
Start: 2020-08-31 | End: 2020-09-07

## 2020-08-31 RX ORDER — ENOXAPARIN SODIUM 100 MG/ML
40 INJECTION SUBCUTANEOUS EVERY 24 HOURS
Status: DISCONTINUED | OUTPATIENT
Start: 2020-08-31 | End: 2020-08-31 | Stop reason: HOSPADM

## 2020-08-31 RX ORDER — MIDODRINE HYDROCHLORIDE 5 MG/1
5 TABLET ORAL 3 TIMES DAILY
Qty: 90 TABLET | Refills: 3 | Status: SHIPPED | OUTPATIENT
Start: 2020-08-31 | End: 2021-07-29

## 2020-08-31 RX ADMIN — ENOXAPARIN SODIUM 40 MG: 100 INJECTION SUBCUTANEOUS at 12:08

## 2020-08-31 RX ADMIN — DIBASIC SODIUM PHOSPHATE, MONOBASIC POTASSIUM PHOSPHATE AND MONOBASIC SODIUM PHOSPHATE 1 TABLET: 852; 155; 130 TABLET ORAL at 08:08

## 2020-08-31 RX ADMIN — DOCUSATE SODIUM 100 MG: 100 CAPSULE, LIQUID FILLED ORAL at 08:08

## 2020-08-31 RX ADMIN — MIDODRINE HYDROCHLORIDE 5 MG: 5 TABLET ORAL at 08:08

## 2020-08-31 RX ADMIN — HEPARIN SODIUM 5000 UNITS: 5000 INJECTION INTRAVENOUS; SUBCUTANEOUS at 05:08

## 2020-08-31 NOTE — PROGRESS NOTES
Discharge   (GLENNA) met with patient (pt) to discuss discharge plans for today.  Pt presented alert and oriented, sitting in chair, engaged and communicative.  Pt denied any coping issues or concerns and is ready for discharge.  Pt's granddaughter will present to transport the pt to pt's residence upon discharge readiness.  Pt understands pt will discharge with home health orders for physical/orthopedic therapies and elects to receive services with [facility].  GLENNA contacted [facility] ([id number]) to advise.  As per Brittany, pt would not be seen until Thursday of this week with the option of being placed on the cancellation chart for sooner availability; therefore, GLENNA contacted WILEY Richard to advise.  WILEY Richard approved plan in hopes of pt getting an earlier appointment; however, acknowledging the pt's choice of servicing facility.  Pt denied any questions or concerns.  SW remains available for education, resources, support and discharge planning as appropriate.

## 2020-08-31 NOTE — DISCHARGE SUMMARY
[facility]  Liver Transplant  Discharge Summary      Patient Name: Maagly Hutchinson  MRN: [id number]  Admission Date: 8/24/2020  Hospital Length of Stay: 7 days  Discharge Date and Time:  08/31/2020 12:35 PM  Attending Physician: Pa Pressley MD  Discharging Provider: Jose Manuel Sousa NP  Primary Care Provider: Primary Doctor No  Post-Operative Day:      ORGAN:     Disease Etiology:   Donor Type:     CDC High Risk:     Donor CMV Status:   Donor CMV Status:   Donor HBcAB:     Donor HCV Status:     Whole or Partial:   Biliary Anastomosis:   Arterial Anatomy:     HPI:   Ms. Magaly Hutchinson is a 84 year old female with hx of abdominal distention over the past several months which she attributed to weight gain. 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 She has also noted increased edema to her lower extremities, right greater than left.     Procedure(s) (LRB):  EXCISION, MASS, RETROPERITONEUM (Right)     Hospital Course:    Magaly Hutchinson is a 84 year old female s/p massive cystic mass resection on 8/24/20. Pt with large right retroperitoneum mass excision with significant blood loss intra op and administration of 11 units PRBCs, 11 units FFP, 2 packs of platelets, and 2 CRYO. Post op pt hypotensive in PACU and was transferred to ICU for close monitoring. She required 500 ml bolus of LR overnight (POD 1) with improvement in BP noted. She was also started on Midodrine for BP support. Pt was then transferred to TSU once stable. Rajani and New dc'd 8/27. Once BP improved, pt was diuresed with Lasix and albumin with good response. She is progressing well post-op at this time. Pain controlled, ambulating with assistance, tolerating diet, and having BMs. LFTs improving daily and pt reports feeling well this am. Will plan to d/c home today. Pt will need Lovenox x 2 weeks from date of  surgery on discharge. F/u with transplant clinic in one week with lab work.       Final Active Diagnoses:    Diagnosis Date Noted POA    PRINCIPAL PROBLEM:  Liver mass, right lobe [R16.0] 07/30/2020 Yes    Hypophosphatemia [E83.39] 08/27/2020 No    Edema [R60.9] 08/06/2020 Yes    Constipation [K59.00] 08/06/2020 Yes    Anemia [D64.9] 08/06/2020 Yes      Problems Resolved During this Admission:           Pending Diagnostic Studies:     Procedure Component Value Units Date/Time    Specimen to Pathology, Surgery Liver [[id number]] Collected: 08/24/20 1120    Order Status: Sent Lab Status: In process Updated: 08/24/20 1437        Significant Diagnostic Studies: Labs:   CMP   Recent Labs   Lab 08/30/20  0628 08/31/20  0603    142   K 3.7 3.7   * 112*   CO2 21* 23   GLU 87 83   BUN 8 9   CREATININE 0.6 0.7   CALCIUM 8.1* 7.9*   PROT 5.1* 5.1*   ALBUMIN 2.2* 2.1*   BILITOT 0.6 0.5   ALKPHOS 72 73   AST 16 15   ALT 7* 6*   ANIONGAP 6* 7*   ESTGFRAFRICA >60.0 >60.0   EGFRNONAA >60.0 >60.0   , CBC   Recent Labs   Lab 08/30/20  0628 08/31/20  0603   WBC 6.87 7.84   HGB 8.0* 8.0*   HCT 26.2* 26.9*    225   , INR   Lab Results   Component Value Date    INR 1.3 (H) 08/24/2020    INR 1.6 (H) 08/24/2020    INR 1.1 08/06/2020        The patients clinical status was discussed at multidisplinary rounds, involving transplant surgery, transplant medicine, pharmacy, nursing, nutrition, and social work    Discharged Condition: good    Disposition: Home or Self Care    Follow Up:    Patient Instructions:      Ambulatory referral/consult to Home Health   Standing Status: Future   Referral Priority: Routine Referral Type: Home Health Care   Referral Reason: Specialty Services Required   Requested Specialty: Home Health Services   Number of Visits Requested: 1     Lifting restrictions   Order Comments: Do not lift anything greater than 10lbs for at least 6 weeks from surgery date.     Call MD for:  temperature >100.4      Call MD for:  persistent nausea and vomiting or diarrhea     Call MD for:  severe uncontrolled pain     Call MD for:  redness, tenderness, or signs of infection (pain, swelling, redness, odor or green/yellow discharge around incision site)     Call MD for:  difficulty breathing or increased cough     Call MD for:  severe persistent headache     Call MD for:  worsening rash     Call MD for:  persistent dizziness, light-headedness, or visual disturbances     Call MD for:  increased confusion or weakness     Call MD for:   Order Comments: For any concerning signs or symptoms.     Medications:  Reconciled Home Medications:      Medication List      START taking these medications    bisacodyL 5 mg EC tablet  Commonly known as: DULCOLAX  Take 2 tablets (10 mg total) by mouth daily as needed for Constipation.     docusate sodium 100 MG capsule  Commonly known as: COLACE  Take 1 capsule (100 mg total) by mouth 3 (three) times daily as needed for Constipation.     enoxaparin 40 mg/0.4 mL Syrg  Commonly known as: LOVENOX  Inject 0.4 mLs (40 mg total) into the skin once daily. for 7 days     midodrine 5 MG Tab  Commonly known as: PROAMATINE  Take 1 tablet (5 mg total) by mouth 3 (three) times daily.     polyethylene glycol 17 gram Pwpk  Commonly known as: GLYCOLAX  Take 17 g by mouth daily as needed.        CHANGE how you take these medications    metFORMIN 500 MG ER 24hr tablet  Commonly known as: GLUCOPHAGE-XR  TAKE 1 TABLET BY MOUTH EVERY DAY FOR DIABETES. TAKE WITH EVENING MEAL  What changed: Another medication with the same name was removed. Continue taking this medication, and follow the directions you see here.        CONTINUE taking these medications    ACCU-CHEK PATTI PLUS METER [facility]  Generic drug: blood-glucose meter     aspirin 81 MG EC tablet  Commonly known as: ECOTRIN  Take 81 mg by mouth every other day.     furosemide 20 MG tablet  Commonly known as: LASIX  TAKE 1 TABLET BY MOUTH EVERY DAY AS NEEDED FOR LEG  SWELLING     pravastatin 20 MG tablet  Commonly known as: PRAVACHOL  TAKE 1 TABLET BY MOUTH AT BEDTIME FOR CHOLESTEROL     traMADoL 50 mg tablet  Commonly known as: ULTRAM  Take 1 tablet (50 mg total) by mouth every 12 (twelve) hours as needed for Pain.        STOP taking these medications    amLODIPine 10 MG tablet  Commonly known as: NORVASC     naproxen sodium 220 MG tablet  Commonly known as: ANAPROX     niacin 500 MG Cpsr          Time spent caring for patient (Greater than 1/2 spent in direct face-to-face contact): > 30 minutes    Jose Manuel Sousa NP  Liver Transplant  [facility]

## 2020-08-31 NOTE — PROGRESS NOTES
Discharge Medication Note:    Hospital Course: 84 year old female s/p resection of retroperitoneal mass on 8/24/20. Significant blood loss intra-op with post-op hypotension. Midodrine 5 mg TID started for blood pressure support. Discharged home with additional 7 days of enoxaparin     Met with Magaly Hutchinson at discharge to review discharge medications and to update the blue medication card.       Magaly Hutchinson   Home Medication Instructions [facility]:[id number]    Printed on:08/31/20 1223   Medication Information                      ACCU-CHEK PATTI PLUS METER Misc               aspirin (ECOTRIN) 81 MG EC tablet  Take 81 mg by mouth every other day.              bisacodyL (DULCOLAX) 5 mg EC tablet  Take 2 tablets (10 mg total) by mouth daily as needed for Constipation.             docusate sodium (COLACE) 100 MG capsule  Take 1 capsule (100 mg total) by mouth 3 (three) times daily as needed for Constipation.             enoxaparin (LOVENOX) 40 mg/0.4 mL Syrg  Inject 0.4 mLs (40 mg total) into the skin once daily. for 7 days             furosemide (LASIX) 20 MG tablet  TAKE 1 TABLET BY MOUTH EVERY DAY AS NEEDED FOR LEG SWELLING             metformin (GLUCOPHAGE-XR) 500 MG 24 hr tablet  TAKE 1 TABLET BY MOUTH EVERY DAY FOR DIABETES. TAKE WITH EVENING MEAL             midodrine (PROAMATINE) 5 MG Tab  Take 1 tablet (5 mg total) by mouth 3 (three) times daily.             polyethylene glycol (GLYCOLAX) 17 gram PwPk  Take 17 g by mouth daily as needed.             pravastatin (PRAVACHOL) 20 MG tablet  TAKE 1 TABLET BY MOUTH AT BEDTIME FOR CHOLESTEROL             traMADoL (ULTRAM) 50 mg tablet  Take 1 tablet (50 mg total) by mouth every 12 (twelve) hours as needed for Pain.                 Pt was provided with prescriptions for the following meds:  E-rx: Enoxaparin, midodrine  Printed rx: None  Phone-in or faxed rx: None    The following medications have been placed on HOLD and should be restarted in the outpatient setting (when  appropriate): Amlodipine (midodrine started with inpatient due to low blood pressure)     Magaly Hutchinson verbalized understanding and had the opportunity to ask questions.

## 2020-08-31 NOTE — PLAN OF CARE
AAOx4, VSS, AC/HS, no coverage needed overnight  Midline incision remains CDI, denies pain, LFT's trending down  Purewick remains in place overnight for incontinence episodes. Will get up to BSC when prompted.  Sleeping between care, non skid socks worn, call light within reach, will cont to monitor  tentative d/c today

## 2020-08-31 NOTE — PT/OT/SLP PROGRESS
"Occupational Therapy   Treatment    Name: Magaly Hutchinson  MRN: [id number]  Admitting Diagnosis:  Liver mass, right lobe  7 Days Post-Op    Recommendations:     Discharge Recommendations: home with home health  Discharge Equipment Recommendations:  walker, rolling  Barriers to discharge:  None    Assessment:     Magaly Hutchinson is a 84 y.o. female with a medical diagnosis of Liver mass, right lobe. Performance deficits affecting function are weakness, impaired endurance, impaired self care skills, impaired functional mobilty, gait instability, impaired balance, decreased coordination.     Rehab Prognosis:  Good; patient would benefit from acute skilled OT services to address these deficits and reach maximum level of function.       Plan:     Patient to be seen 4 x/week to address the above listed problems via self-care/home management, therapeutic activities, therapeutic exercises  · Plan of Care Expires: 09/25/20  · Plan of Care Reviewed with: patient    Subjective     Pain/Comfort:  · Pain Rating 1: 0/10    Objective:     Communicated with: rn prior to session.  Patient found up in chair with hernandez catheter, oxygen, peripheral IV upon OT entry to room.    General Precautions: Standard, fall   Orthopedic Precautions:    Braces:       Occupational Performance:     Functional Mobility/Transfers:  · Patient completed Sit <> Stand Transfer with supervision  with  rolling walker   · Functional Mobility: Pt walked ~ 30' x 2 SBA with a .    [facility] 6 Click ADL: 21    Treatment & Education:  From chair level, pt completed BUE/LE therex (x 15 reps each) including:  - elbow flexion/extension  - straight arm raises  - hor Abd/Add  - knee flexion/extension  - RUE "wall walking" --- able to achieve near full flexion.    Patient left up in chair with call button in reachEducation:      GOALS:   Multidisciplinary Problems     Occupational Therapy Goals        Problem: Occupational Therapy Goal    Goal Priority Disciplines Outcome " Interventions   Occupational Therapy Goal     OT, PT/OT Ongoing, Progressing    Description: Goals to be met by: 9/2/20    Patient will increase functional independence with ADLs by performing:    LE Dressing with Minimal Assistance and Assistive Devices as needed.  Grooming while standing at sink with Supervision.  Toileting from toilet with Contact Guard Assistance for hygiene and clothing management. MET 8/28 - (I)  Supine to sit with Supervision.  Toilet transfer to toilet with Supervision.                     Time Tracking:     OT Date of Treatment: 08/31/20  OT Start Time: 0842  OT Stop Time: 0910  OT Total Time (min): 28 min    Billable Minutes:Therapeutic Activity 14  Therapeutic Exercise 14    MARYBETH Hernandez  8/31/2020

## 2020-09-01 ENCOUNTER — PATIENT OUTREACH (OUTPATIENT)
Dept: ADMINISTRATIVE | Facility: CLINIC | Age: 85
End: 2020-09-01

## 2020-09-01 NOTE — PATIENT INSTRUCTIONS
Discharge Instructions: Caring for Your Abdominal Incision  You are going home with stitches (sutures), surgical staples, special strips of tape, or surgical skin glue. One of these items was used to close your incision, help stop bleeding, and speed healing. Follow the tips on this sheet to help your incision heal.   Home care  · Clean your work area:  ¨ Put pets in another room.  ¨ Use soap and water to clean the surface youll be working on.  ¨ Spread a clean cloth or paper towel over the surface.  ¨ Move away from the clean surface if you need to cough or sneeze.  · Gather your supplies:  ¨ Packaged dressing for your wound  ¨ Irrigation solutions (if using these)  ¨ Pair of scissors (cleaned with soap and water)  ¨ Medical tape  ¨ Disposable gloves (2 pairs)  ¨ Clean plastic trash bag (open it before you wash your hands)  · Wash your hands:  ¨ Use liquid soap.  ¨ Work up a good lather and scrub for 1 to 2 minutes.  ¨ Be sure to scrub between your fingers and under your nails.  ¨ Rinse with warm water, keeping your fingers pointed down.  ¨ Use a clean paper towel to dry your hands and turn off the faucet.  · Prepare your dressing supplies:  ¨ Peel back the edges of the dressing packages. Pour any irrigation solutions into solution cups.  ¨ Cut each piece of tape 4 inches longer than the dressing.  · Remove the old dressing:  ¨ Put on disposable gloves.  ¨ Loosen the tape on the dressing by pulling gently toward the incision. Remove the dressing one layer at a time. Put it in the plastic bag immediately.  ¨ Remove your gloves and put them in the plastic bag. Wash your hands.  ¨ Put on a new pair of gloves.  · Clean and dress the incision:  ¨ Clean the incision and apply a new dressing as directed.  ¨ Do not remove the special strips of tape even if they are starting to loosen.   ¨ Put all used supplies in the plastic bag. Remove your gloves last and put them in the plastic bag. Seal the bag and put it in the  trash.  ¨ Be sure to wash your hands again.  Care for specific closures  Follow these guidelines unless your healthcare provider tells you otherwise:  · Sutures or staples. Once you no longer need to keep these dry, clean the wound daily, using the instructions listed above. First remove the bandage using clean hands. Then wash the area gently with soap and warm water. Use a wet cotton swab to loosen and remove any blood or crust that forms. After cleaning, put a thin layer of antibiotic ointment on. Then put on a new bandage.  · Skin glue. Dont put liquid, ointment, or cream on your wound while the glue is in place. Avoid activities that cause heavy sweating. Protect the wound from sunlight. Do not scratch, rub, or pick at the glue. Do not put tape directly over the glue. The glue should peel off within 5 to 10 days.  · Surgical tape. Keep the area dry. If it gets wet, blot the area dry with a clean towel. Surgical tape usually falls off within 7 to 10 days. If it has not fallen off after 10 days, contact your healthcare provider before taking it off yourself. If you are told to remove the tape, put mineral oil or petroleum jelly on a cotton ball. Gently rub the tape until it is removed.  Follow-up care  Follow up with your healthcare provider to ask how long sutures or staples should be left in place. Be sure to return for suture or staple removal as directed.  If tape closures were used, remove them yourself when your provider recommends if they have not fallen off on their own. If skin glue was used, the glue will wear off by itself.     When to call your healthcare provider  Call your healthcare provider right away if you have any of the following:  · More pain, bleeding, redness, swelling, or foul-smelling discharge around the incision area  · Fever of 100.4°F (38°C) or higher, or as directed by your healthcare provider  · Shaking chills  · Vomiting or nausea that doesnt go away  · Numbness, coldness, or  tingling around the incision area, or changes in skin color  · Opening of sutures or wound  · Stitches or staples come apart or fall out or surgical tape falls off before 7 days, or as directed by your provider   Date Last Reviewed: 8/1/2016  © 5129-5544 [employer]. [address] [place] [address]. All rights reserved. This information is not intended as a substitute for professional medical care. Always follow your healthcare professional's instructions.

## 2020-09-03 PROCEDURE — G0180 MD CERTIFICATION HHA PATIENT: HCPCS | Mod: ,,, | Performed by: TRANSPLANT SURGERY

## 2020-09-03 PROCEDURE — G0180 PR HOME HEALTH MD CERTIFICATION: ICD-10-PCS | Mod: ,,, | Performed by: TRANSPLANT SURGERY

## 2020-09-09 LAB
COMMENT: NORMAL
FINAL PATHOLOGIC DIAGNOSIS: NORMAL
FROZEN SECTION DIAGNOSIS: NORMAL
FROZEN SECTION FOOTNOTE: NORMAL
GROSS: NORMAL

## 2020-09-10 ENCOUNTER — OFFICE VISIT (OUTPATIENT)
Dept: TRANSPLANT | Facility: CLINIC | Age: 85
End: 2020-09-10
Payer: MEDICARE

## 2020-09-10 ENCOUNTER — LAB VISIT (OUTPATIENT)
Dept: LAB | Facility: HOSPITAL | Age: 85
End: 2020-09-10
Payer: MEDICARE

## 2020-09-10 VITALS
SYSTOLIC BLOOD PRESSURE: 124 MMHG | TEMPERATURE: 99 F | HEIGHT: 66 IN | DIASTOLIC BLOOD PRESSURE: 60 MMHG | WEIGHT: 176.38 LBS | RESPIRATION RATE: 18 BRPM | BODY MASS INDEX: 28.34 KG/M2 | OXYGEN SATURATION: 97 % | HEART RATE: 87 BPM

## 2020-09-10 DIAGNOSIS — Z79.02 LONG TERM (CURRENT) USE OF ANTITHROMBOTICS/ANTIPLATELETS: ICD-10-CM

## 2020-09-10 DIAGNOSIS — R19.01 ABDOMINAL MASS, RIGHT UPPER QUADRANT: ICD-10-CM

## 2020-09-10 DIAGNOSIS — Z90.49 HISTORY OF RESECTION OF LIVER: ICD-10-CM

## 2020-09-10 PROBLEM — R16.0 LIVER MASS, RIGHT LOBE: Status: RESOLVED | Noted: 2020-07-30 | Resolved: 2020-09-10

## 2020-09-10 LAB
ALBUMIN SERPL BCP-MCNC: 2.9 G/DL (ref 3.5–5.2)
ALP SERPL-CCNC: 84 U/L (ref 55–135)
ALT SERPL W/O P-5'-P-CCNC: 7 U/L (ref 10–44)
ANION GAP SERPL CALC-SCNC: 8 MMOL/L (ref 8–16)
AST SERPL-CCNC: 18 U/L (ref 10–40)
BASOPHILS # BLD AUTO: 0.12 K/UL (ref 0–0.2)
BASOPHILS NFR BLD: 1.1 % (ref 0–1.9)
BILIRUB SERPL-MCNC: 0.5 MG/DL (ref 0.1–1)
BUN SERPL-MCNC: 24 MG/DL (ref 8–23)
CALCIUM SERPL-MCNC: 9.2 MG/DL (ref 8.7–10.5)
CHLORIDE SERPL-SCNC: 107 MMOL/L (ref 95–110)
CO2 SERPL-SCNC: 24 MMOL/L (ref 23–29)
CREAT SERPL-MCNC: 0.8 MG/DL (ref 0.5–1.4)
DIFFERENTIAL METHOD: ABNORMAL
EOSINOPHIL # BLD AUTO: 1.8 K/UL (ref 0–0.5)
EOSINOPHIL NFR BLD: 15.9 % (ref 0–8)
ERYTHROCYTE [DISTWIDTH] IN BLOOD BY AUTOMATED COUNT: 17.7 % (ref 11.5–14.5)
EST. GFR  (AFRICAN AMERICAN): >60 ML/MIN/1.73 M^2
EST. GFR  (NON AFRICAN AMERICAN): >60 ML/MIN/1.73 M^2
GLUCOSE SERPL-MCNC: 117 MG/DL (ref 70–110)
HCT VFR BLD AUTO: 32.9 % (ref 37–48.5)
HGB BLD-MCNC: 9.9 G/DL (ref 12–16)
IMM GRANULOCYTES # BLD AUTO: 0.05 K/UL (ref 0–0.04)
IMM GRANULOCYTES NFR BLD AUTO: 0.4 % (ref 0–0.5)
LYMPHOCYTES # BLD AUTO: 1.6 K/UL (ref 1–4.8)
LYMPHOCYTES NFR BLD: 14.6 % (ref 18–48)
MAGNESIUM SERPL-MCNC: 2.1 MG/DL (ref 1.6–2.6)
MCH RBC QN AUTO: 29.2 PG (ref 27–31)
MCHC RBC AUTO-ENTMCNC: 30.1 G/DL (ref 32–36)
MCV RBC AUTO: 97 FL (ref 82–98)
MONOCYTES # BLD AUTO: 0.8 K/UL (ref 0.3–1)
MONOCYTES NFR BLD: 7.2 % (ref 4–15)
NEUTROPHILS # BLD AUTO: 6.8 K/UL (ref 1.8–7.7)
NEUTROPHILS NFR BLD: 60.8 % (ref 38–73)
NRBC BLD-RTO: 0 /100 WBC
PHOSPHATE SERPL-MCNC: 4.3 MG/DL (ref 2.7–4.5)
PLATELET # BLD AUTO: 382 K/UL (ref 150–350)
PMV BLD AUTO: 8.9 FL (ref 9.2–12.9)
POTASSIUM SERPL-SCNC: 4.2 MMOL/L (ref 3.5–5.1)
PROT SERPL-MCNC: 7.3 G/DL (ref 6–8.4)
RBC # BLD AUTO: 3.39 M/UL (ref 4–5.4)
SODIUM SERPL-SCNC: 139 MMOL/L (ref 136–145)
WBC # BLD AUTO: 11.24 K/UL (ref 3.9–12.7)

## 2020-09-10 PROCEDURE — 99024 POSTOP FOLLOW-UP VISIT: CPT | Mod: S$GLB,,, | Performed by: TRANSPLANT SURGERY

## 2020-09-10 PROCEDURE — 99999 PR PBB SHADOW E&M-EST. PATIENT-LVL IV: ICD-10-PCS | Mod: PBBFAC,,,

## 2020-09-10 PROCEDURE — 36415 COLL VENOUS BLD VENIPUNCTURE: CPT

## 2020-09-10 PROCEDURE — 99999 PR PBB SHADOW E&M-EST. PATIENT-LVL IV: CPT | Mod: PBBFAC,,,

## 2020-09-10 PROCEDURE — 85025 COMPLETE CBC W/AUTO DIFF WBC: CPT

## 2020-09-10 PROCEDURE — 84100 ASSAY OF PHOSPHORUS: CPT

## 2020-09-10 PROCEDURE — 80053 COMPREHEN METABOLIC PANEL: CPT

## 2020-09-10 PROCEDURE — 99024 PR POST-OP FOLLOW-UP VISIT: ICD-10-PCS | Mod: S$GLB,,, | Performed by: TRANSPLANT SURGERY

## 2020-09-10 PROCEDURE — 83735 ASSAY OF MAGNESIUM: CPT

## 2020-09-10 NOTE — PROGRESS NOTES
84 F s/p resection of massive cystic abdominal mass. Post op recovery was uneventful. Pt has noted marked decrease in abdominal girth, lower extremity edema and pain. She is far more mobile that pre-op. She denies abdominal pain and has returned to her normal activities of daily living.     Her incision in clean, dry and intact with staples in place.    I reviewed her path report with her and her granddaughter. 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    Remove staples in clinic today. Repeat CT scan abd/pelvis in 3 months. Schedule follow up with primary care physician in next 2-3 weeks.     Pa Pressley MD

## 2020-09-11 ENCOUNTER — TELEPHONE (OUTPATIENT)
Dept: TRANSPLANT | Facility: CLINIC | Age: 85
End: 2020-09-11

## 2020-09-11 DIAGNOSIS — Z90.49 HISTORY OF RESECTION OF LIVER: Primary | ICD-10-CM

## 2020-09-11 DIAGNOSIS — C24.9 MALIGNANT NEOPLASM OF BILIARY TRACT, UNSPECIFIED: ICD-10-CM

## 2020-09-11 NOTE — TELEPHONE ENCOUNTER
Called patient to see where she wants to have her CT in 3 months. Patient prefers [facility]. Will schedule and mail out appt slip. Patient verbalized understanding.

## 2020-09-12 ENCOUNTER — EXTERNAL HOME HEALTH (OUTPATIENT)
Dept: HOME HEALTH SERVICES | Facility: HOSPITAL | Age: 85
End: 2020-09-12
Payer: MEDICARE

## 2020-09-14 ENCOUNTER — DOCUMENT SCAN (OUTPATIENT)
Dept: HOME HEALTH SERVICES | Facility: HOSPITAL | Age: 85
End: 2020-09-14
Payer: MEDICARE

## 2020-09-14 ENCOUNTER — TUMOR BOARD CONFERENCE (OUTPATIENT)
Dept: SURGERY | Facility: CLINIC | Age: 85
End: 2020-09-14

## 2020-09-14 PROCEDURE — 99499 NO LOS: ICD-10-PCS | Mod: ,,, | Performed by: TRANSPLANT SURGERY

## 2020-09-14 PROCEDURE — 99499 UNLISTED E&M SERVICE: CPT | Mod: ,,, | Performed by: TRANSPLANT SURGERY

## 2020-09-14 NOTE — PROGRESS NOTES
[facility] MULTIDISCIPLINARY TUMOR BOARD  PATIENT REVIEW FORM   ____________________________________________________________    DATE: 9/14/2020  CLINIC #:  [id number]          DIAGNOSIS: liver/ retroperitoneal mass     PRESENTER: Seal     PATIENT SUMMARY:   This relatively active 83 y/o female with progressive weight gain over past 2-3 years. She experienced abd discomfort and venous stasis in BLE so imaging obtained. Reviewed CT scan 5/2020 - large, hypoattentuating mass, appears to arise from liver, noted peripheral calcifications with peripheral enhancement. Noted cystic components. Stable over the past few months on CT and MRI. Tumor markers WNL.  She was presented to this [facility] [place] last month. Since then she underwent surgical resection per Dr. Pressley. Today's presentation is for pathology review. Necrotic tissue but does not appear to be tumor necrosis; no viable tissue, long standing hemorrhagic lesion with adrenal tissue.     BOARD RECOMMENDATIONS:   Surveillance CT scan in 3 months.     CONSULT NEEDED:     [] Surgery    [] Hem/Onc    [] Rad/Onc    [] Dietary       [] Social Service    [] Psychology       [] AES  [] Radiology          [x] Anh'l Treatment Guidelines reviewed and care planned is consistent with guidelines.         (i.e., NCCN, NCI, PD, ACO, AUA, etc.)    PRESENTATION AT CANCER CONFERENCE:         [] Prospective    [x] Retrospective     [] Follow-Up

## 2020-09-15 NOTE — PHYSICIAN QUERY
PT Name: Magaly Hutchinson  MR #: [id number]     Documentation Clarification      CDS/: Laurence Jin               Contact information:    This form is a permanent document in the medical record.     Query Date: September 15, 2020    By submitting this query, we are merely seeking further clarification of documentation. Please utilize your independent clinical judgment when addressing the question(s) below.    The Medical Record reflects the following:    Supporting Clinical Findings Location in Medical Record   s/p massive cystic mass resection on 8/24/20. Pt with large right retroperitoneum mass excision with significant blood loss intra op and administration of 11 units PRBCs, 11 units FFP, 2 packs of platelets, and 2 CRYO    Liver mass, right lobe     1.  Soft tissue, abdomen (biopsy):   - entirely necrotic tissue; no neoplasm   2.  Soft tissue, cyst contents (excision):   - Hemorrhagic/ infarcted tissue with extensive necrosis and fibrotic capsule   - No evidence of neoplasm   3.  Soft tissue, abdominal mass (excision):   - Hemorrhagic/ infarcted tissue with extensive necrosis   - Rare benign adrenal tissue present   - No evidence of neoplasm   Negative for malignancy.  Non viable tissue.  MB   Verbally reported to Dr. Pressley     Liver cyst fluid, cytology:   Negative for malignant cells.   Sparsely cellular.  Consistent with cystic contents.     Massive retroperoneal mass with dense adhesion to liver, bowel, kidney, retroperitoneum and vena cava, large vein draining mass into vena cava primary source of bleeding during case, highly vascularized cyst wall, ~8L dark brown fluid evacuated from cystic portion of mass, debris sent for fozen - necrotic, non-viable tissue.    At this point we suspect the cyst possible arose from the adrenal gland based on location of the dominant vascular supply. The cystic mass was removed and sent for pathology.   DC Summary         DC Summary    Pathology  8/24                            Pathology 8/24        Op Note 8/24        Op Note 8/24                                                                                      Provider, please provide diagnosis or diagnoses associated with above clinical findings.  [   ] Adrenal Cystic Mass, nonmalignant      [   ] Liver cystic Mass, nonmalignant       [   ] Retroperitoneal  Mass, nonmalignant    [   ] Other (please specify): ____________   [ x ] Clinically undetermined                                                                                                           Present on admission (POA) status:   [   ] Yes (Y)                          [  ] Clinically Undetermined (W)  [   ] No (N)                            [   ] Documentation insufficient to determine if condition is POA (U)

## 2020-09-17 NOTE — PHYSICIAN QUERY
PT Name: Magaly Hutchinson  MR #: [id number]     Documentation Clarification      CDS/: Laurence Jin               Contact information: [email or web address]     This form is a permanent document in the medical record.     Query Date: September 17, 2020    By submitting this query, we are merely seeking further clarification of documentation. Please utilize your independent clinical judgment when addressing the question(s) below.    The Medical Record reflects the following:    Supporting Clinical Findings Location in Medical Record   s/p massive cystic mass resection on 8/24/20. Pt with large right retroperitoneum mass excision with significant blood loss intra op and administration of 11 units PRBCs, 11 units FFP, 2 packs of platelets, and 2 CRYO    Liver mass, right lobe     1.  Soft tissue, abdomen (biopsy):   - entirely necrotic tissue; no neoplasm   2.  Soft tissue, cyst contents (excision):   - Hemorrhagic/ infarcted tissue with extensive necrosis and fibrotic capsule   - No evidence of neoplasm   3.  Soft tissue, abdominal mass (excision):   - Hemorrhagic/ infarcted tissue with extensive necrosis   - Rare benign adrenal tissue present   - No evidence of neoplasm   Negative for malignancy.  Non viable tissue.  MB   Verbally reported to Dr. Pressley     Liver cyst fluid, cytology:   Negative for malignant cells.   Sparsely cellular.  Consistent with cystic contents.     Massive retroperoneal mass with dense adhesion to liver, bowel, kidney, retroperitoneum and vena cava, large vein draining mass into vena cava primary source of bleeding during case, highly vascularized cyst wall, ~8L dark brown fluid evacuated from cystic portion of mass, debris sent for fozen - necrotic, non-viable tissue.    At this point we suspect the cyst possible arose from the adrenal gland based on location of the dominant vascular supply. The cystic mass was removed and sent for pathology.   DC Summary         DC  Summary    Pathology 8/24                            Pathology 8/24        Op Note 8/24        Op Note 8/24                                                                                      Provider, please provide diagnosis or diagnoses associated with above clinical findings.  [ X  ] Adrenal Cystic Mass, nonmalignant      [   ] Liver cystic Mass, nonmalignant       [   ] Retroperitoneal  Mass, nonmalignant    [   ] Other (please specify): ____________   [   ] Clinically undetermined                                                                                                           Present on admission (POA) status:   [ X   ] Yes (Y)                          [  ] Clinically Undetermined (W)  [   ] No (N)                            [   ] Documentation insufficient to determine if condition is POA (U)

## 2020-10-05 ENCOUNTER — DOCUMENT SCAN (OUTPATIENT)
Dept: HOME HEALTH SERVICES | Facility: HOSPITAL | Age: 85
End: 2020-10-05
Payer: MEDICARE

## 2020-11-30 ENCOUNTER — HOSPITAL ENCOUNTER (OUTPATIENT)
Dept: RADIOLOGY | Facility: HOSPITAL | Age: 85
Discharge: HOME | End: 2020-11-30
Attending: TRANSPLANT SURGERY
Payer: MEDICARE

## 2020-11-30 ENCOUNTER — DOCUMENT SCAN (OUTPATIENT)
Dept: HOME HEALTH SERVICES | Facility: HOSPITAL | Age: 85
End: 2020-11-30
Payer: MEDICARE

## 2020-11-30 DIAGNOSIS — Z90.49 HISTORY OF RESECTION OF LIVER: ICD-10-CM

## 2020-11-30 DIAGNOSIS — C24.9 MALIGNANT NEOPLASM OF BILIARY TRACT, UNSPECIFIED: ICD-10-CM

## 2020-11-30 PROCEDURE — 74150 CT ABDOMEN W/O CONTRAST: CPT | Mod: 26,,, | Performed by: RADIOLOGY

## 2020-11-30 PROCEDURE — 74150 CT ABDOMEN W/O CONTRAST: CPT | Mod: TC

## 2020-11-30 PROCEDURE — 74150 CT ABDOMEN WITHOUT CONTRAST: ICD-10-PCS | Mod: 26,,, | Performed by: RADIOLOGY

## 2021-05-06 ENCOUNTER — PATIENT MESSAGE (OUTPATIENT)
Dept: RESEARCH | Facility: HOSPITAL | Age: 86
End: 2021-05-06

## 2022-10-24 NOTE — TELEPHONE ENCOUNTER
----- Message from Royce Carrillo sent at 7/16/2020  4:23 PM CDT -----  Pt returning missed call    [phone]    
Patient called about referral.  Patient reports that the referral should have come from Dr GIRISH Cabrera's office.  Called the office to get additional information and request.  Patient's referral will be sent by the's office for review.  Patient was unable to recall where imaging was done at this time.   
16

## 2023-04-25 NOTE — ASSESSMENT & PLAN NOTE
No recent problem ( this year )    Odomzo Counseling- I discussed with the patient the risks of Odomzo including but not limited to nausea, vomiting, diarrhea, constipation, weight loss, changes in the sense of taste, decreased appetite, muscle spasms, and hair loss.  The patient verbalized understanding of the proper use and possible adverse effects of Odomzo.  All of the patient's questions and concerns were addressed.

## 2024-11-10 NOTE — ASSESSMENT & PLAN NOTE
- s/p massive cystic mass resection 8/24 with large volume blood loss intra op requiring 11 unit PRBCs, 11 units FFP, 1 pk plts.  - pt hypotensive immediately post op and on transfer to [facility].  - Required 500 ml bolus of LR.  - BP improving.   - Continue Midodrine 5 mg tid.  - Tolerating diet, pain controlled with Tylenol/Ibuprofen.  - Encouraged oob, ambulation, working with PT.  - Gerard catheter removed 8/27.  - dc NATALEE 8/27.  - Diurese with lasix 20 mg IV. Albumin ordered x 3.  - Monitor.      None

## 2025-05-07 NOTE — ASSESSMENT & PLAN NOTE
Type 2 Diabetes Mellitus  On treatment with oral agent,not on  Insulin    Hemoglobin A1c-   Capillary glucose check-None   Watches diet  Weight loss, reduced oral intake may have helped her diabetes    DM complications     Not known to have kidney ,eye involvement  Gets annual eye checks    No Stroke, Coronary artery disease   No suggestions of lower extremity claudication       Diabetes Mellitus-I suggest monitoring the glucose in the perioperative period ( Before meals and bed time,if the patient is on oral feeds or every 6 hourly ,if the patient is NPO )  Blood glucose target in hospitalized patients is 140-180. Oral Hypoglycemic agents are generally avoided during the hospital stay . If glucose is consistently elevated ,I suggest using basal ,prandial Insulin regimen to control the glucose , as elevated glucose can be associated with adverse surgical out comes. Please consider involving Hospital Medicine or Endocrinology ,if any help is needed with Glucose control. Patient will be instructed based on the pre op clinic guidelines  about adjustment of diabetic treatment (If applicable )  considering the NPO status for Surgery     A1c was 5.1      Price (Do Not Change): 0.00 Instructions: This plan will send the code FBSE to the PM system.  DO NOT or CHANGE the price. Detail Level: Simple

## (undated) DEVICE — SEALER LIGASURE IMPACT 18CM

## (undated) DEVICE — CART STAPLE RELD 45MM WHT

## (undated) DEVICE — LOOP VESSEL BLUE MAXI

## (undated) DEVICE — DRESSING ABSRBNT ISLAND 3.6X8

## (undated) DEVICE — SUT PROLENE 6-0 BV-1 30IN

## (undated) DEVICE — SUT 4-0 12-30IN SILK

## (undated) DEVICE — SEE MEDLINE ITEM 156911

## (undated) DEVICE — APPLICATOR CHLORAPREP ORN 26ML

## (undated) DEVICE — SEE MEDLINE ITEM 146417

## (undated) DEVICE — DRAPE INCISE IOBAN 2 23X17IN

## (undated) DEVICE — TRAY FOLEY 16FR INFECTION CONT

## (undated) DEVICE — GOWN SURGICAL X-LARGE

## (undated) DEVICE — SUT SILK 3-0 STRANDS 30IN

## (undated) DEVICE — PAD K-THERMIA 24IN X 60IN

## (undated) DEVICE — CLIP SPRING 12MM

## (undated) DEVICE — FIBRILLAR ABS HEMOSTAT 4X4

## (undated) DEVICE — CLIP LIGATION MEDIUM CLIPS 1

## (undated) DEVICE — SUT SILK 2-0 STRANDS 30IN

## (undated) DEVICE — DRESSING ADH ISLAND 3.6 X 14

## (undated) DEVICE — DRAPE STERI INSTRUMENT 1018

## (undated) DEVICE — DRAIN CHANNEL ROUND 19FR

## (undated) DEVICE — SEE MEDLINE ITEM 152622

## (undated) DEVICE — SYR ONLY LUER LOCK 20CC

## (undated) DEVICE — WARMER DRAPE STERILE LF

## (undated) DEVICE — HEMOSTAT SURGICEL NU-KNIT 6X9

## (undated) DEVICE — SUT 1 36IN PDS II VIO MONO

## (undated) DEVICE — COVER LIGHT HANDLE 80/CA

## (undated) DEVICE — SUT PROLENE 5-0 24 C-1 BL

## (undated) DEVICE — SET DECANTER MEDICHOICE

## (undated) DEVICE — SYR 10CC LUER LOCK

## (undated) DEVICE — SEE MEDLINE ITEM 146313

## (undated) DEVICE — SUT PROLENE 3-0 SH DA 36 BL

## (undated) DEVICE — CLIP SPRING 6MM

## (undated) DEVICE — SOL NS 1000CC

## (undated) DEVICE — STAPLER INT LINEAR ARTC 3.5-45

## (undated) DEVICE — ELECTRODE REM PLYHSV RETURN 9

## (undated) DEVICE — SUT 4/0 27IN PDS II VIO MO

## (undated) DEVICE — SUT PROLENE 4-0 SH BLU 36IN

## (undated) DEVICE — PACK UNIVERSAL SPLIT II

## (undated) DEVICE — CLIPPER BLADE MOD 4406 (CAREF)

## (undated) DEVICE — TIP SONAPET STRAIGHT